# Patient Record
Sex: MALE | Race: WHITE | NOT HISPANIC OR LATINO | Employment: OTHER | URBAN - METROPOLITAN AREA
[De-identification: names, ages, dates, MRNs, and addresses within clinical notes are randomized per-mention and may not be internally consistent; named-entity substitution may affect disease eponyms.]

---

## 2024-05-07 ENCOUNTER — APPOINTMENT (OUTPATIENT)
Dept: RADIOLOGY | Facility: AMBULARY SURGERY CENTER | Age: 74
End: 2024-05-07
Attending: ORTHOPAEDIC SURGERY
Payer: MEDICARE

## 2024-05-07 VITALS — HEIGHT: 67 IN | BODY MASS INDEX: 27.15 KG/M2 | WEIGHT: 173 LBS

## 2024-05-07 DIAGNOSIS — M25.571 PAIN, JOINT, ANKLE AND FOOT, RIGHT: ICD-10-CM

## 2024-05-07 DIAGNOSIS — Z01.89 ENCOUNTER FOR LOWER EXTREMITY COMPARISON IMAGING STUDY: ICD-10-CM

## 2024-05-07 DIAGNOSIS — M19.171 POST-TRAUMATIC ARTHRITIS OF RIGHT ANKLE: Primary | ICD-10-CM

## 2024-05-07 PROCEDURE — 73600 X-RAY EXAM OF ANKLE: CPT

## 2024-05-07 PROCEDURE — 99204 OFFICE O/P NEW MOD 45 MIN: CPT | Performed by: ORTHOPAEDIC SURGERY

## 2024-05-07 PROCEDURE — 73610 X-RAY EXAM OF ANKLE: CPT

## 2024-05-07 RX ORDER — FERROUS SULFATE 324(65)MG
324 TABLET, DELAYED RELEASE (ENTERIC COATED) ORAL
COMMUNITY
Start: 2024-04-04

## 2024-05-07 RX ORDER — LISINOPRIL 5 MG/1
5 TABLET ORAL DAILY
COMMUNITY
Start: 2024-02-26 | End: 2025-02-25

## 2024-05-07 RX ORDER — ASCORBIC ACID 500 MG
500 TABLET ORAL DAILY
COMMUNITY
Start: 2024-04-04

## 2024-05-07 RX ORDER — PANTOPRAZOLE SODIUM 40 MG/1
40 TABLET, DELAYED RELEASE ORAL DAILY
COMMUNITY
Start: 2024-03-30

## 2024-05-07 RX ORDER — ATORVASTATIN CALCIUM 40 MG/1
40 TABLET, FILM COATED ORAL
COMMUNITY

## 2024-05-07 RX ORDER — SODIUM, POTASSIUM,MAG SULFATES 17.5-3.13G
SOLUTION, RECONSTITUTED, ORAL ORAL
COMMUNITY
Start: 2024-04-02

## 2024-05-07 NOTE — PROGRESS NOTES
James R Lachman, M.D.  Attending, Orthopaedic Surgery  Foot and Ankle  [facility]        ORTHOPAEDIC FOOT AND ANKLE CLINIC VISIT     Assessment:     Encounter Diagnoses   Name Primary?    Pain, joint, ankle and foot, right     Encounter for lower extremity comparison imaging study     Post-traumatic arthritis of right ankle Yes              Plan:   The patient verbalized understanding of exam findings and treatment plan. We engaged in the shared decision-making process and treatment options were discussed at length with the patient. Surgical and conservative management discussed today along with risks and benefits.  Patient has end-stage osteoarthritis of the right ankle that has failed to respond to conservative treatment measures with corticosteroid injection, physical therapy, and bracing.    He is a surgical candidate for a right total ankle arthroplasty with possible medial calcaneal slide osteotomy, possible cotton osteotomy  and Ross  Details regarding surgical treatment were reviewed and discussed extensively with the patient  He will follow-up 6 weeks prior to when he would like to schedule right total ankle arthroplasty  Rest, ice, elevation, over-the-counter anti-inflammatories as needed for pain control  Return if symptoms worsen or fail to improve.      History of Present Illness:   Chief Complaint:   Chief Complaint   Patient presents with    Right Ankle - Pain     Right ankle replacement surgery.      Nura Yañez is a 73 y.o. male who is being seen for right ankle pain. He has a history of right ankle osteoarthritis that has failed conservative treatment (PT, CSI, and bracing). He recently underwent a left total ankle arthropalsty by Dr. Zheng at [facility] on 12/7/23. He is doing well with regards to the left ankle and happy with the outcome.  Pain is localized at diffusely throughout the ankle with minimal radiating and described as sharp and severe. Patient denies  "numbness, tingling or radicular pain.  Denies history of neuropathy.  Patient does not smoke, does not have diabetes and does not take blood thinners.  Patient denies family history of anesthesia complications and has not had any complications with anesthesia.     Pain/symptom timing:  Worse during the day when active  Pain/symptom context:  Worse with activites and work  Pain/symptom modifying factors:  Rest makes better, activities make worse  Pain/symptom associated signs/symptoms: none    Prior treatment   NSAIDsYes    Injections Yes   Bracing/Orthotics Yes   Physical Therapy Yes     Orthopedic Surgical History:   See above     Past Medical, Surgical and Social History:  Past Medical History:  has no past medical history on file.  Problem List: does not have any pertinent problems on file.  Past Surgical History:  has no past surgical history on file.  Family History: family history is not on file.  Social History:  reports that he has never smoked. He has never used smokeless tobacco. He reports current alcohol use of about 2.0 standard drinks of alcohol per week. He reports that he does not use drugs.  Current Medications: has a current medication list which includes the following prescription(s): ascorbic acid, atorvastatin, ferrous sulfate, ferrous sulfate, lisinopril, na sulfate-k sulfate-mg sulf, and pantoprazole.  Allergies: has no allergies on file.     Review of Systems:  General- denies fever/chills  HEENT- denies hearing loss or sore throat  Eyes- denies eye pain or visual disturbances, denies red eyes  Respiratory- denies cough or SOB  Cardio- denies chest pain or palpitations  GI- denies abdominal pain  Endocrine- denies urinary frequency  Urinary- denies pain with urination  Musculoskeletal- Negative except noted above  Skin- denies rashes or wounds  Neurological- denies dizziness or headache  Psychiatric- denies anxiety or difficulty concentrating    Physical Exam:   Ht 5' 7\" (1.702 m)   Wt 78.5 " kg (173 lb)   BMI 27.10 kg/m²   General/Constitutional: No apparent distress: well-nourished and well developed.  Eyes: normal ocular motion  Cardio: RRR, Normal S1S2, No m/r/g  Lymphatic: No appreciable lymphadenopathy  Respiratory: Non-labored breathing, CTA b/l no w/c/r  Vascular: No edema, swelling or tenderness, except as noted in detailed exam.  Integumentary: No impressive skin lesions present, except as noted in detailed exam.  Neuro: No ataxia or tremors noted  Psych: Normal mood and affect, oriented to person, place and time. Appropriate affect.  Musculoskeletal: Normal, except as noted in detailed exam and in HPI.    Examination    Right    Gait Antalgic   Musculoskeletal Tender to palpation at diffusely ankle    Skin Normal.      Nails Normal    Range of Motion  10 degrees dorsiflexion, 20 degrees plantarflexion  Subtalar motion: noraml    Stability Stable    Muscle Strength 5/5 tibialis anterior  5/5 gastrocnemius-soleus  5/5 posterior tibialis  5/5 peroneal/eversion strength  5/5 EHL  5/5 FHL    Neurologic Normal    Sensation Intact to light touch throughout sural, saphenous, superficial peroneal, deep peroneal and medial/lateral plantar nerve distributions.  [place]her[place]-Jay 5.07 filament (10g) testing deferred.    Cardiovascular Brisk capillary refill < 2 seconds,intact DP and PT pulses    Special Tests None      Imaging Studies:   3 views of the right ankle were taken, reviewed and interpreted independently that demonstrate severe degenerative changes of the tibiotalar joint; stable left total ankle implants in appropriate alignment without signs of failure or loosening. Reviewed by me personally.        James R. Lachman, MD  Foot & Ankle Surgery   Department of Orthopaedic Surgery  [facility]      I personally performed the service.    James R. Lachman, MD

## 2024-06-14 ENCOUNTER — APPOINTMENT (EMERGENCY)
Dept: RADIOLOGY | Facility: HOSPITAL | Age: 74
End: 2024-06-14
Payer: MEDICARE

## 2024-06-14 ENCOUNTER — HOSPITAL ENCOUNTER (OUTPATIENT)
Facility: HOSPITAL | Age: 74
Setting detail: OBSERVATION
Discharge: HOME | End: 2024-06-15
Attending: STUDENT IN AN ORGANIZED HEALTH CARE EDUCATION/TRAINING PROGRAM | Admitting: HOSPITALIST
Payer: MEDICARE

## 2024-06-14 DIAGNOSIS — R07.9 CHEST PAIN: Primary | ICD-10-CM

## 2024-06-14 DIAGNOSIS — R03.0 ELEVATED BLOOD PRESSURE READING: ICD-10-CM

## 2024-06-14 PROBLEM — E61.1 IRON DEFICIENCY: Status: ACTIVE | Noted: 2024-06-14

## 2024-06-14 PROBLEM — I10 HYPERTENSION: Status: ACTIVE | Noted: 2024-06-14

## 2024-06-14 LAB
2HR DELTA HS TROPONIN: 0 NG/L
4HR DELTA HS TROPONIN: 0 NG/L
ALBUMIN SERPL BCP-MCNC: 4.3 G/DL (ref 3.5–5)
ALP SERPL-CCNC: 79 U/L (ref 34–104)
ALT SERPL W P-5'-P-CCNC: 16 U/L (ref 7–52)
ANION GAP SERPL CALCULATED.3IONS-SCNC: 9 MMOL/L (ref 4–13)
AST SERPL W P-5'-P-CCNC: 19 U/L (ref 13–39)
BASOPHILS # BLD AUTO: 0.03 THOUSANDS/ÂΜL (ref 0–0.1)
BASOPHILS NFR BLD AUTO: 0 % (ref 0–1)
BILIRUB SERPL-MCNC: 0.34 MG/DL (ref 0.2–1)
BUN SERPL-MCNC: 16 MG/DL (ref 5–25)
CALCIUM SERPL-MCNC: 9 MG/DL (ref 8.4–10.2)
CARDIAC TROPONIN I PNL SERPL HS: 7 NG/L
CHLORIDE SERPL-SCNC: 105 MMOL/L (ref 96–108)
CO2 SERPL-SCNC: 23 MMOL/L (ref 21–32)
CREAT SERPL-MCNC: 1 MG/DL (ref 0.6–1.3)
D DIMER PPP FEU-MCNC: 0.43 UG/ML FEU
EOSINOPHIL # BLD AUTO: 0.06 THOUSAND/ÂΜL (ref 0–0.61)
EOSINOPHIL NFR BLD AUTO: 1 % (ref 0–6)
ERYTHROCYTE [DISTWIDTH] IN BLOOD BY AUTOMATED COUNT: 19.4 % (ref 11.6–15.1)
GFR SERPL CREATININE-BSD FRML MDRD: 75 ML/MIN/1.73SQ M
GLUCOSE SERPL-MCNC: 153 MG/DL (ref 65–140)
HCT VFR BLD AUTO: 34 % (ref 36.5–49.3)
HGB BLD-MCNC: 9.6 G/DL (ref 12–17)
IMM GRANULOCYTES # BLD AUTO: 0.15 THOUSAND/UL (ref 0–0.2)
IMM GRANULOCYTES NFR BLD AUTO: 2 % (ref 0–2)
LYMPHOCYTES # BLD AUTO: 2.04 THOUSANDS/ÂΜL (ref 0.6–4.47)
LYMPHOCYTES NFR BLD AUTO: 22 % (ref 14–44)
MCH RBC QN AUTO: 19.5 PG (ref 26.8–34.3)
MCHC RBC AUTO-ENTMCNC: 28.2 G/DL (ref 31.4–37.4)
MCV RBC AUTO: 69 FL (ref 82–98)
MONOCYTES # BLD AUTO: 0.69 THOUSAND/ÂΜL (ref 0.17–1.22)
MONOCYTES NFR BLD AUTO: 8 % (ref 4–12)
NEUTROPHILS # BLD AUTO: 6.2 THOUSANDS/ÂΜL (ref 1.85–7.62)
NEUTS SEG NFR BLD AUTO: 67 % (ref 43–75)
NRBC BLD AUTO-RTO: 0 /100 WBCS
PLATELET # BLD AUTO: 368 THOUSANDS/UL (ref 149–390)
PMV BLD AUTO: 10.1 FL (ref 8.9–12.7)
POTASSIUM SERPL-SCNC: 3.5 MMOL/L (ref 3.5–5.3)
PROT SERPL-MCNC: 6.7 G/DL (ref 6.4–8.4)
RBC # BLD AUTO: 4.92 MILLION/UL (ref 3.88–5.62)
SODIUM SERPL-SCNC: 137 MMOL/L (ref 135–147)
WBC # BLD AUTO: 9.17 THOUSAND/UL (ref 4.31–10.16)

## 2024-06-14 PROCEDURE — 99223 1ST HOSP IP/OBS HIGH 75: CPT | Performed by: HOSPITALIST

## 2024-06-14 PROCEDURE — 36415 COLL VENOUS BLD VENIPUNCTURE: CPT

## 2024-06-14 PROCEDURE — 80053 COMPREHEN METABOLIC PANEL: CPT | Performed by: STUDENT IN AN ORGANIZED HEALTH CARE EDUCATION/TRAINING PROGRAM

## 2024-06-14 PROCEDURE — 85025 COMPLETE CBC W/AUTO DIFF WBC: CPT | Performed by: STUDENT IN AN ORGANIZED HEALTH CARE EDUCATION/TRAINING PROGRAM

## 2024-06-14 PROCEDURE — 99285 EMERGENCY DEPT VISIT HI MDM: CPT | Performed by: STUDENT IN AN ORGANIZED HEALTH CARE EDUCATION/TRAINING PROGRAM

## 2024-06-14 PROCEDURE — 84484 ASSAY OF TROPONIN QUANT: CPT | Performed by: STUDENT IN AN ORGANIZED HEALTH CARE EDUCATION/TRAINING PROGRAM

## 2024-06-14 PROCEDURE — 99285 EMERGENCY DEPT VISIT HI MDM: CPT

## 2024-06-14 PROCEDURE — 84484 ASSAY OF TROPONIN QUANT: CPT | Performed by: HOSPITALIST

## 2024-06-14 PROCEDURE — 71045 X-RAY EXAM CHEST 1 VIEW: CPT

## 2024-06-14 PROCEDURE — 85379 FIBRIN DEGRADATION QUANT: CPT | Performed by: STUDENT IN AN ORGANIZED HEALTH CARE EDUCATION/TRAINING PROGRAM

## 2024-06-14 PROCEDURE — 93005 ELECTROCARDIOGRAM TRACING: CPT

## 2024-06-14 RX ORDER — ATORVASTATIN CALCIUM 40 MG/1
40 TABLET, FILM COATED ORAL
Status: DISCONTINUED | OUTPATIENT
Start: 2024-06-14 | End: 2024-06-15 | Stop reason: HOSPADM

## 2024-06-14 RX ORDER — ENOXAPARIN SODIUM 100 MG/ML
40 INJECTION SUBCUTANEOUS DAILY
Status: DISCONTINUED | OUTPATIENT
Start: 2024-06-15 | End: 2024-06-15 | Stop reason: HOSPADM

## 2024-06-14 RX ORDER — LISINOPRIL 5 MG/1
5 TABLET ORAL DAILY
Status: DISCONTINUED | OUTPATIENT
Start: 2024-06-15 | End: 2024-06-15 | Stop reason: HOSPADM

## 2024-06-14 RX ORDER — PANTOPRAZOLE SODIUM 40 MG/1
40 TABLET, DELAYED RELEASE ORAL
Status: DISCONTINUED | OUTPATIENT
Start: 2024-06-15 | End: 2024-06-15 | Stop reason: HOSPADM

## 2024-06-14 RX ADMIN — ATORVASTATIN CALCIUM 40 MG: 40 TABLET, FILM COATED ORAL at 21:02

## 2024-06-14 NOTE — H&P
[facility]  H&P  Name: Nura Yañez 73 y.o. male I MRN: [id number]  Unit/Bed#: ED-29 I Date of Admission: 6/14/2024   Date of Service: 6/14/2024 I Hospital Day: 0      Assessment & Plan   * Chest pain  Assessment & Plan  POA; described as pressure, occurred with minimal exertion; no h/o similar  HEART score 7; ELLA 2  Trop: wnl; EKG nonischemic  Trend trops, serial EKGs  Tele overnight  Anticipate dc in 24 hours with plan for OP stress     Hypertension  Assessment & Plan  Resume lisinopril     Iron deficiency  Assessment & Plan  Cont outpt iron infusions          VTE Pharmacologic Prophylaxis: VTE Score: 3 Moderate Risk (Score 3-4) - Pharmacological DVT Prophylaxis Ordered: enoxaparin (Lovenox).  Code Status: FULL   Discussion with family: Updated  (wife) at bedside.    Anticipated Length of Stay: Patient will be admitted on an observation basis with an anticipated length of stay of less than 2 midnights secondary to chest pain .    Total Time Spent on Date of Encounter in care of patient: 60 mins. This time was spent on one or more of the following: performing physical exam; counseling and coordination of care; obtaining or reviewing history; documenting in the medical record; reviewing/ordering tests, medications or procedures; communicating with other healthcare professionals and discussing with patient's family/caregivers.    Chief Complaint: chest pressure     History of Present Illness:  Nura Yañez is a 73 y.o. male with a PMH of hypertension, hiatal hernia, GALILEA who presents with an episode of chest pressure.  Patient was grocery shopping with his wife when he felt some discomfort in his lower chest.  No associated shortness of breath, diaphoresis.  Did not radiate.  Did not feel he was exerting himself at the time.  Patient is fairly active, swims 3 times weekly.  Denies similar symptoms with exertion.  Received his first iron infusion yesterday and questions  whether this could be related..    Review of Systems:  Review of Systems   Constitutional:  Negative for chills and fever.   Respiratory:  Negative for choking, chest tightness and shortness of breath.    Cardiovascular:  Positive for chest pain. Negative for palpitations and leg swelling.       Past Medical and Surgical History:   History reviewed. No pertinent past medical history.    History reviewed. No pertinent surgical history.    Meds/Allergies:  Prior to Admission medications    Medication Sig Start Date End Date Taking? Authorizing Provider   ascorbic acid (VITAMIN C) 500 mg tablet Take 500 mg by mouth daily 4/4/24   Historical Provider, MD   atorvastatin (LIPITOR) 40 mg tablet Take 40 mg by mouth daily at bedtime    Historical Provider, MD   Ferrous Sulfate (IRON PO) Take by mouth    Historical Provider, MD   ferrous sulfate 324 (65 Fe) mg Take 324 mg by mouth daily with breakfast 4/4/24   Historical Provider, MD   lisinopril (ZESTRIL) 5 mg tablet Take 5 mg by mouth daily 2/26/24 2/25/25  Historical Provider, MD   Na Sulfate-K Sulfate-Mg Sulf 17.5-3.13-1.6 GM/177ML SOLN FOLLOW DIRECTIONS PROVIDED BY PRESCRIBER 4/2/24   Historical Provider, MD   pantoprazole (PROTONIX) 40 mg tablet Take 40 mg by mouth daily 3/30/24   Historical Provider, MD     I have reviewed home medications using recent Epic encounter.    Allergies: No Known Allergies    Social History:  Marital Status: /Civil Union   Occupation:   Patient Pre-hospital Living Situation: Home  Patient Pre-hospital Level of Mobility: walks  Patient Pre-hospital Diet Restrictions:   Substance Use History:   Social History     Substance and Sexual Activity   Alcohol Use Yes    Alcohol/week: 2.0 standard drinks of alcohol    Types: 2 Cans of beer per week     Social History     Tobacco Use   Smoking Status Never   Smokeless Tobacco Never     Social History     Substance and Sexual Activity   Drug Use Never       Family History:  History reviewed. No  pertinent family history.    Physical Exam:     Vitals:   Blood Pressure: 143/69 (06/14/24 1425)  Pulse: 88 (06/14/24 1425)  Temperature: 98.2 °F (36.8 °C) (06/14/24 1329)  Respirations: 16 (06/14/24 1425)  SpO2: 98 % (06/14/24 1425)    Physical Exam  Vitals and nursing note reviewed.   Constitutional:       General: He is not in acute distress.     Appearance: Normal appearance. He is not ill-appearing or toxic-appearing.   Cardiovascular:      Rate and Rhythm: Normal rate and regular rhythm.      Heart sounds: No murmur heard.     No friction rub. No gallop.   Pulmonary:      Effort: Pulmonary effort is normal. No respiratory distress.      Breath sounds: Normal breath sounds. No wheezing, rhonchi or rales.   Abdominal:      General: Abdomen is flat.      Palpations: Abdomen is soft.      Tenderness: There is no abdominal tenderness. There is no guarding or rebound.   Musculoskeletal:      Right lower leg: No edema.      Left lower leg: No edema.   Neurological:      General: No focal deficit present.      Mental Status: He is alert and oriented to person, place, and time.          Additional Data:     Lab Results:  Results from last 7 days   Lab Units 06/14/24  1329   WBC Thousand/uL 9.17   HEMOGLOBIN g/dL 9.6*   HEMATOCRIT % 34.0*   PLATELETS Thousands/uL 368   SEGS PCT % 67   LYMPHO PCT % 22   MONO PCT % 8   EOS PCT % 1     Results from last 7 days   Lab Units 06/14/24  1329   SODIUM mmol/L 137   POTASSIUM mmol/L 3.5   CHLORIDE mmol/L 105   CO2 mmol/L 23   BUN mg/dL 16   CREATININE mg/dL 1.00   ANION GAP mmol/L 9   CALCIUM mg/dL 9.0   ALBUMIN g/dL 4.3   TOTAL BILIRUBIN mg/dL 0.34   ALK PHOS U/L 79   ALT U/L 16   AST U/L 19   GLUCOSE RANDOM mg/dL 153*             Lab Results   Component Value Date    HGBA1C 4.5 03/30/2024    HGBA1C  03/30/2024     Unable to determine A1c due to interfering substance, reflexed to alternate methodology.    HGBA1C 5.1 02/19/2014           Lines/Drains:  Invasive Devices        Peripheral Intravenous Line  Duration             Peripheral IV 06/14/24 Left Antecubital <1 day                        Imaging: Reviewed radiology reports from this admission including: chest xray  XR chest 1 view portable   Final Result by Chris Martínez MD (06/14 1621)   Hiatal hernia.   No acute cardiopulmonary disease.            Workstation performed: [id number]             EKG and Other Studies Reviewed on Admission:   EKG: Sinus Tachycardia. .    ** Please Note: This note has been constructed using a voice recognition system. **

## 2024-06-14 NOTE — ASSESSMENT & PLAN NOTE
POA; described as pressure, occurred with minimal exertion; no h/o similar  HEART score 7; ELLA 2  Trop: wnl; EKG nonischemic  Trend trops, serial EKGs  Tele overnight  Anticipate dc in 24 hours with plan for OP stress

## 2024-06-14 NOTE — ED ATTENDING ATTESTATION
6/14/2024  I, Bernabe Grullon DO, saw and evaluated the patient. I have discussed the patient with the resident/non-physician practitioner and agree with the resident's/non-physician practitioner's findings, Plan of Care, and MDM as documented in the resident's/non-physician practitioner's note, except where noted. All available labs and Radiology studies were reviewed.  I was present for key portions of any procedure(s) performed by the resident/non-physician practitioner and I was immediately available to provide assistance.       At this point I agree with the current assessment done in the Emergency Department.  I have conducted an independent evaluation of this patient a history and physical is as follows:    73 year old male presents to ED for evaluation of chest pain.  Mild in severity.  Occurred while walking around at grocery store.  Located substernal without radiation.  Associated with low back pain.  Has relatively new diagnosis of anemia and had first iron infusion yesterday.  Unknown if related.  Has not seen a cardiologist for at least 10 years, has not had a stress test for at least 20 years.  On exam, resting comfortably in no acute distress, speaking in full sentences with no respiratory difficulty, pulse regular with normal rate, moving all extremities with no gross motor deficit.  EKG is nonischemic as interpreted by myself.  Labs show no leukocytosis, stable anemia compared to prior labs, normal platelet count, no acute electrode abnormalities, normal renal function, hyperglycemia 153, no acute LFT abnormalities initial troponin of 7.  Heart score of 7: 2 for history, 1 for EKG, 2 for age, 2 for risk factors, 0 for troponin.  Chest x-ray shows no acute cardiopulmonary pathology as interpreted by myself pending final radiology read.  Patient is low risk Wells PE however due to age cannot apply PERC, D-dimer sent and found to be negative.  Due to high heart score, plan will be for hospitalization.   Resident discussed with admitting team who accept the patient onto their service for further care and management.  Stable to time of disposition    ED Course  ED Course as of 06/14/24 1551   Fri Jun 14, 2024   1549 Lab called for add on of d-dimer ordered at 1539         Critical Care Time  Procedures

## 2024-06-14 NOTE — ED PROVIDER NOTES
History  Chief Complaint   Patient presents with    Chest Pain     Chest and back tightness, resolved     HPI    Nura Yañez is a 73 y.o. male with history of HTN, HLD, hernia, anema presenting for chest pain.    Patient presents with wife after sudden onset chest pain starting just before presentation. Pain described as squeezing in the center of his chest, as well as some squeezing/spasm across his lower back. No other associated symptoms, no shortness of breath, lightheadedness, dizziness, diaphoresis, nausea or vomiting. Patient had never had this pain before. No history of cardiac issues. No history of clots, no leg pain or swelling, no immobility, no recent surgeries, no hemoptysis. History of GERD. No history of previous MI. Symptoms resolved by time of evaluation.    Patient did have a recent iron infusion yesterday due to his chronic anemia, patient reports he was told that chest tightness and back pain were possible side effects.    Prior to Admission Medications   Prescriptions Last Dose Informant Patient Reported? Taking?   Ferrous Sulfate (IRON PO)   Yes No   Sig: Take by mouth   Na Sulfate-K Sulfate-Mg Sulf 17.5-3.13-1.6 GM/177ML SOLN   Yes No   Sig: FOLLOW DIRECTIONS PROVIDED BY PRESCRIBER   ascorbic acid (VITAMIN C) 500 mg tablet   Yes No   Sig: Take 500 mg by mouth daily   atorvastatin (LIPITOR) 40 mg tablet   Yes No   Sig: Take 40 mg by mouth daily at bedtime   ferrous sulfate 324 (65 Fe) mg   Yes No   Sig: Take 324 mg by mouth daily with breakfast   lisinopril (ZESTRIL) 5 mg tablet   Yes No   Sig: Take 5 mg by mouth daily   pantoprazole (PROTONIX) 40 mg tablet   Yes No   Sig: Take 40 mg by mouth daily      Facility-Administered Medications: None       History reviewed. No pertinent past medical history.    History reviewed. No pertinent surgical history.    History reviewed. No pertinent family history.  I have reviewed and agree with the history as documented.    E-Cigarette/Vaping      E-Cigarette/Vaping Substances     Social History     Tobacco Use    Smoking status: Never    Smokeless tobacco: Never   Substance Use Topics    Alcohol use: Yes     Alcohol/week: 2.0 standard drinks of alcohol     Types: 2 Cans of beer per week    Drug use: Never        Review of Systems   Constitutional:  Negative for chills and fever.   HENT:  Negative for congestion, rhinorrhea and sore throat.    Eyes:  Negative for pain and visual disturbance.   Respiratory:  Negative for cough, chest tightness, shortness of breath, wheezing and stridor.    Cardiovascular:  Positive for chest pain. Negative for palpitations and leg swelling.   Gastrointestinal:  Negative for abdominal pain, constipation, diarrhea, nausea and vomiting.   Genitourinary:  Negative for dysuria and hematuria.   Musculoskeletal:  Positive for back pain (lower back). Negative for arthralgias.   Skin:  Negative for color change, pallor and rash.   Neurological:  Negative for dizziness, syncope, light-headedness, numbness and headaches.   Psychiatric/Behavioral:  Negative for behavioral problems.    All other systems reviewed and are negative.      Physical Exam  ED Triage Vitals   Temperature Pulse Respirations Blood Pressure SpO2   06/14/24 1329 06/14/24 1329 06/14/24 1329 06/14/24 1329 06/14/24 1329   98.2 °F (36.8 °C) (!) 115 18 (!) 174/84 98 %      Temp src Heart Rate Source Patient Position - Orthostatic VS BP Location FiO2 (%)   -- 06/14/24 1425 06/14/24 1425 06/14/24 1425 --    Monitor Sitting Right arm       Pain Score       06/14/24 1858       No Pain             Orthostatic Vital Signs  Vitals:    06/14/24 1329 06/14/24 1425 06/14/24 1837 06/14/24 1914   BP: (!) 174/84 143/69 152/74 159/84   Pulse: (!) 115 88 68 66   Patient Position - Orthostatic VS:  Sitting         Physical Exam  Vitals and nursing note reviewed.   Constitutional:       General: He is not in acute distress.     Appearance: Normal appearance. He is well-developed.    HENT:      Head: Normocephalic and atraumatic.      Nose: No rhinorrhea.      Mouth/Throat:      Mouth: Mucous membranes are moist.      Pharynx: Oropharynx is clear.   Eyes:      Extraocular Movements: Extraocular movements intact.      Conjunctiva/sclera: Conjunctivae normal.      Pupils: Pupils are equal, round, and reactive to light.   Cardiovascular:      Rate and Rhythm: Normal rate and regular rhythm.      Pulses: Normal pulses.      Heart sounds: Normal heart sounds. No murmur heard.  Pulmonary:      Effort: Pulmonary effort is normal. No respiratory distress.      Breath sounds: Normal breath sounds. No wheezing, rhonchi or rales.   Abdominal:      General: Abdomen is flat. Bowel sounds are normal. There is no distension.      Palpations: Abdomen is soft.      Tenderness: There is no abdominal tenderness. There is no guarding or rebound.   Musculoskeletal:      Cervical back: Neck supple.      Right lower leg: No edema.      Left lower leg: No edema.   Skin:     General: Skin is warm and dry.      Capillary Refill: Capillary refill takes less than 2 seconds.   Neurological:      General: No focal deficit present.      Mental Status: He is alert and oriented to person, place, and time.   Psychiatric:         Mood and Affect: Mood normal.         Behavior: Behavior normal.         ED Medications  Medications   atorvastatin (LIPITOR) tablet 40 mg (has no administration in time range)   lisinopril (ZESTRIL) tablet 5 mg (has no administration in time range)   pantoprazole (PROTONIX) EC tablet 40 mg (has no administration in time range)   enoxaparin (LOVENOX) subcutaneous injection 40 mg (has no administration in time range)       Diagnostic Studies  Results Reviewed       Procedure Component Value Units Date/Time    HS Troponin I 4hr [[id number]]  (Normal) Collected: 06/14/24 1834    Lab Status: Final result Specimen: Blood from Arm, Left Updated: 06/14/24 1908     hs TnI 4hr 7 ng/L      Delta 4hr hsTnI 0 ng/L      HS Troponin I 2hr [[id number]]  (Normal) Collected: 06/14/24 1614    Lab Status: Final result Specimen: Blood from Arm, Left Updated: 06/14/24 1654     hs TnI 2hr 7 ng/L      Delta 2hr hsTnI 0 ng/L     D-dimer, quantitative [[id number]]  (Normal) Collected: 06/14/24 1329    Lab Status: Final result Specimen: Blood from Arm, Left Updated: 06/14/24 1604     D-Dimer, Quant 0.43 ug/ml FEU     Narrative:      In the evaluation for possible pulmonary embolism, in the appropriate (Well's Score of 4 or less) patient, the age adjusted d-dimer cutoff for this patient can be calculated as:    Age x 0.01 (in ug/mL) for Age-adjusted D-dimer exclusion threshold for a patient over 50 years.    HS Troponin 0hr (reflex protocol) [[id number]]  (Normal) Collected: 06/14/24 1329    Lab Status: Final result Specimen: Blood from Arm, Left Updated: 06/14/24 1406     hs TnI 0hr 7 ng/L     Comprehensive metabolic panel [[id number]]  (Abnormal) Collected: 06/14/24 1329    Lab Status: Final result Specimen: Blood from Arm, Left Updated: 06/14/24 1404     Sodium 137 mmol/L      Potassium 3.5 mmol/L      Chloride 105 mmol/L      CO2 23 mmol/L      ANION GAP 9 mmol/L      BUN 16 mg/dL      Creatinine 1.00 mg/dL      Glucose 153 mg/dL      Calcium 9.0 mg/dL      AST 19 U/L      ALT 16 U/L      Alkaline Phosphatase 79 U/L      Total Protein 6.7 g/dL      Albumin 4.3 g/dL      Total Bilirubin 0.34 mg/dL      eGFR 75 ml/min/1.73sq m     Narrative:      [employer] Kid[employer] guidelines for Chronic Kidney Disease (CKD):     Stage 1 with normal or high GFR (GFR > 90 mL/min/1.73 square meters)    Stage 2 Mild CKD (GFR = 60-89 mL/min/1.73 square meters)    Stage 3A Moderate CKD (GFR = 45-59 mL/min/1.73 square meters)    Stage 3B Moderate CKD (GFR = 30-44 mL/min/1.73 square meters)    Stage 4 Severe CKD (GFR = 15-29 mL/min/1.73 square meters)    Stage 5 End Stage CKD (GFR <15 mL/min/1.73 square meters)  Note: GFR calculation is accurate only  with a steady state creatinine    CBC and differential [[id number]]  (Abnormal) Collected: 06/14/24 1329    Lab Status: Final result Specimen: Blood from Arm, Left Updated: 06/14/24 1343     WBC 9.17 Thousand/uL      RBC 4.92 Million/uL      Hemoglobin 9.6 g/dL      Hematocrit 34.0 %      MCV 69 fL      MCH 19.5 pg      MCHC 28.2 g/dL      RDW 19.4 %      MPV 10.1 fL      Platelets 368 Thousands/uL      nRBC 0 /100 WBCs      Segmented % 67 %      Immature Grans % 2 %      Lymphocytes % 22 %      Monocytes % 8 %      Eosinophils Relative 1 %      Basophils Relative 0 %      Absolute Neutrophils 6.20 Thousands/µL      Absolute Immature Grans 0.15 Thousand/uL      Absolute Lymphocytes 2.04 Thousands/µL      Absolute Monocytes 0.69 Thousand/µL      Eosinophils Absolute 0.06 Thousand/µL      Basophils Absolute 0.03 Thousands/µL                    XR chest 1 view portable   Final Result by Chris Martínez MD (06/14 1621)   Hiatal hernia.   No acute cardiopulmonary disease.            Workstation performed: [id number]               Procedures  ECG 12 Lead Documentation Only    Date/Time: 6/14/2024 2:39 PM    Performed by: Yadira Mackey MD  Authorized by: Yadira Mackey MD    Indications / Diagnosis:  Chest pain  ECG reviewed by me, the ED Provider: yes    Patient location:  ED  Previous ECG:     Previous ECG:  Compared to current    Comparison ECG info:  2/18/2014    Similarity:  Changes noted    Comparison to cardiac monitor: Yes    Interpretation:     Interpretation: non-specific    Quality:     Tracing quality:  Limited by artifact  Rate:     ECG rate:  121    ECG rate assessment: tachycardic    Rhythm:     Rhythm: sinus tachycardia    Ectopy:     Ectopy: none    QRS:     QRS axis:  Normal    QRS intervals:  Normal  Conduction:     Conduction: abnormal      Abnormal conduction: incomplete RBBB    ST segments:     ST segments:  Normal  T waves:     T waves: non-specific    Comments:      QTc 457        ED  Course  ED Course as of 06/14/24 2019 Fri Jun 14, 2024   1424 Patient seen and evaluated at bedside.   1442 hs TnI 0hr: 7  Will need 2 hour   1442 Hemoglobin(!): 9.6  Chronic anemia, improved from previous   1442 CBC and differential(!)  No leukocytosis, chronic anemia improved from last value in the 7's, platelets normal   1442 Comprehensive metabolic panel(!)  Grossly normal   1607 D-Dimer, Quant: 0.43  Unlikely to be PE/VTE at this time             HEART Risk Score      Flowsheet Row Most Recent Value   Heart Score Risk Calculator    History 2 Filed at: 06/14/2024 1415   ECG 1 Filed at: 06/14/2024 1415   Age 2 Filed at: 06/14/2024 1415   Risk Factors 2 Filed at: 06/14/2024 1415   Troponin 0 Filed at: 06/14/2024 1415   HEART Score 7 Filed at: 06/14/2024 1415                          ELLA Risk Score      Flowsheet Row Most Recent Value   Age >= 65 1 Filed at: 06/14/2024 1811   Known CAD (stenosis >= 50%) 0 Filed at: 06/14/2024 1811   Recent (<=24 hrs) Service Angina 0 Filed at: 06/14/2024 1811   ST Deviation >= 0.5 mm 0 Filed at: 06/14/2024 1811   3+ CAD Risk Factors (FHx, HTN, HLP, DM, Smoker) 1 Filed at: 06/14/2024 1811   Aspirin Use Past 7 Days 0 Filed at: 06/14/2024 1811   Elevated Cardiac Markers 0 Filed at: 06/14/2024 1811   ELLA Risk Score (Calculated) 2 Filed at: 06/14/2024 1811          Wells' Criteria for PE      Flowsheet Row Most Recent Value   Wells' Criteria for PE    Clinical signs and symptoms of DVT 0 Filed at: 06/14/2024 1539   PE is primary diagnosis or equally likely 0 Filed at: 06/14/2024 1539   HR >100 1.5 Filed at: 06/14/2024 1539   Immobilization at least 3 days or Surgery in the previous 4 weeks 0 Filed at: 06/14/2024 1539   Previous, objectively diagnosed PE or DVT 0 Filed at: 06/14/2024 1539   Hemoptysis 0 Filed at: 06/14/2024 1539   Malignancy with treatment within 6 months or palliative 0 Filed at: 06/14/2024 1539   Wells' Criteria Total 1.5 Filed at: 06/14/2024 1539               Medical Decision Making  Nura Yañez is a 73 y.o. male presenting for non-exertional, non-radiating chest pain just prior to presentation to the ED. No nausea, vomiting,   On presentation to the ED, patient was afebrile, vital signs showing hypertension on presentation which improved during time in the ED, otherwise unremarkable. Exam unremarkable, heart regular rhythm, lungs clear bilaterally, no chest tenderness, no abdominal pain.    DDX including but not limited to: ACS, MI, PE, PTX, pneumonia, dissection, pleurisy, pericarditis, myocarditis, rhabdomyolysis, GI etiology.    Based on results available while the patient was in the ED:  Lab work overall unremarkable. Unlikely to have PE but could not PERC out because of age, thankfully D-dimer negative. Patient remained asymptomatic, blood pressure improved. Initial troponin 7, heart score also elevated at 7. Unclear exact etiology of chest pain, possibly related to muslculoskelatal pain vs GI vs unexpected effect of infusion. However, though workup grossly negative, due to elevated heart score secondary to age, story, and risk, patient would most benefit from admission for further monitoring.    See ED Course for further updates    After evaluation and workup in the emergency department Discussed patient's case with [facility] regarding admission who accepted the patient for further evaluation and management under Dr. Brownlee (JOSEPH).    Amount and/or Complexity of Data Reviewed  Independent Historian: karen  External Data Reviewed: labs, radiology, ECG and notes.  Labs: ordered. Decision-making details documented in ED Course.  Radiology: ordered.  ECG/medicine tests: ordered and independent interpretation performed.    Risk  Decision regarding hospitalization.          Disposition  Final diagnoses:   Chest pain   Elevated blood pressure reading     Time reflects when diagnosis was documented in both MDM as applicable and the Disposition within this note        Time User Action Codes Description Comment    6/14/2024  2:34 PM Yadira Mackey Add [R07.9] Chest pain     6/14/2024  4:17 PM Yadira Mackey Add [R03.0] Elevated blood pressure reading           ED Disposition       ED Disposition   Admit    Condition   Stable    Date/Time   Fri Jun 14, 2024 4781    Comment   Case was discussed with JOSEPH and the patient's admission status was agreed to be Admission Status: observation status to the service of Dr. Brownlee .               Follow-up Information    None         Current Discharge Medication List        CONTINUE these medications which have NOT CHANGED    Details   ascorbic acid (VITAMIN C) 500 mg tablet Take 500 mg by mouth daily      atorvastatin (LIPITOR) 40 mg tablet Take 40 mg by mouth daily at bedtime      Ferrous Sulfate (IRON PO) Take by mouth      ferrous sulfate 324 (65 Fe) mg Take 324 mg by mouth daily with breakfast      lisinopril (ZESTRIL) 5 mg tablet Take 5 mg by mouth daily      Na Sulfate-K Sulfate-Mg Sulf 17.5-3.13-1.6 GM/177ML SOLN FOLLOW DIRECTIONS PROVIDED BY PRESCRIBER      pantoprazole (PROTONIX) 40 mg tablet Take 40 mg by mouth daily           No discharge procedures on file.    PDMP Review       None             ED Provider  Attending physically available and evaluated Nura Yañez. I managed the patient along with the ED Attending.    Electronically Signed by           Yadira Mackey MD  06/14/24 2019

## 2024-06-15 VITALS
RESPIRATION RATE: 20 BRPM | HEIGHT: 67 IN | SYSTOLIC BLOOD PRESSURE: 140 MMHG | WEIGHT: 175.49 LBS | OXYGEN SATURATION: 99 % | BODY MASS INDEX: 27.54 KG/M2 | DIASTOLIC BLOOD PRESSURE: 82 MMHG | TEMPERATURE: 97.6 F | HEART RATE: 68 BPM

## 2024-06-15 LAB
ATRIAL RATE: 121 BPM
P AXIS: 16 DEGREES
PLATELET # BLD AUTO: 338 THOUSANDS/UL (ref 149–390)
PMV BLD AUTO: 10.4 FL (ref 8.9–12.7)
PR INTERVAL: 156 MS
QRS AXIS: 57 DEGREES
QRSD INTERVAL: 100 MS
QT INTERVAL: 322 MS
QTC INTERVAL: 457 MS
T WAVE AXIS: 24 DEGREES
VENTRICULAR RATE: 121 BPM

## 2024-06-15 PROCEDURE — 93010 ELECTROCARDIOGRAM REPORT: CPT | Performed by: INTERNAL MEDICINE

## 2024-06-15 PROCEDURE — 85049 AUTOMATED PLATELET COUNT: CPT | Performed by: HOSPITALIST

## 2024-06-15 PROCEDURE — 99239 HOSP IP/OBS DSCHRG MGMT >30: CPT | Performed by: PHYSICIAN ASSISTANT

## 2024-06-15 RX ADMIN — PANTOPRAZOLE SODIUM 40 MG: 40 TABLET, DELAYED RELEASE ORAL at 07:09

## 2024-06-15 RX ADMIN — LISINOPRIL 5 MG: 5 TABLET ORAL at 08:03

## 2024-06-15 NOTE — PLAN OF CARE
Problem: PAIN - ADULT  Goal: Verbalizes/displays adequate comfort level or baseline comfort level  Description: Interventions:  - Encourage patient to monitor pain and request assistance  - Assess pain using appropriate pain scale  - Administer analgesics based on type and severity of pain and evaluate response  - Implement non-pharmacological measures as appropriate and evaluate response  - Consider cultural and social influences on pain and pain management  - Notify physician/advanced practitioner if interventions unsuccessful or patient reports new pain  Outcome: Adequate for Discharge     Problem: SAFETY ADULT  Goal: Patient will remain free of falls  Description: INTERVENTIONS:  - Educate patient/family on patient safety including physical limitations  - Instruct patient to call for assistance with activity   - Consult OT/PT to assist with strengthening/mobility   - Keep Call bell within reach  - Keep bed low and locked with side rails adjusted as appropriate  - Keep care items and personal belongings within reach  - Initiate and maintain comfort rounds  - Make Fall Risk Sign visible to staff    - Apply yellow socks and bracelet for high fall risk patients  - Consider moving patient to room near nurses station  Outcome: Adequate for Discharge  Goal: Maintain or return to baseline ADL function  Description: INTERVENTIONS:  -  Assess patient's ability to carry out ADLs; assess patient's baseline for ADL function and identify physical deficits which impact ability to perform ADLs (bathing, care of mouth/teeth, toileting, grooming, dressing, etc.)  - Assess/evaluate cause of self-care deficits   - Assess range of motion  - Assess patient's mobility; develop plan if impaired  - Assess patient's need for assistive devices and provide as appropriate  - Encourage maximum independence but intervene and supervise when necessary  - Involve family in performance of ADLs  - Assess for home care needs following discharge    - Consider OT consult to assist with ADL evaluation and planning for discharge  - Provide patient education as appropriate  Outcome: Adequate for Discharge  Goal: Maintains/Returns to pre admission functional level  Description: INTERVENTIONS:  - Perform AM-PAC 6 Click Basic Mobility/ Daily Activity assessment daily.  - Set and communicate daily mobility goal to care team and patient/family/caregiver.   - Collaborate with rehabilitation services on mobility goals if consulted  - Out of bed for meals 3 times a day  - Out of bed for toileting  - Record patient progress and toleration of activity level   Outcome: Adequate for Discharge     Problem: DISCHARGE PLANNING  Goal: Discharge to home or other facility with appropriate resources  Description: INTERVENTIONS:  - Identify barriers to discharge w/patient and caregiver  - Arrange for needed discharge resources and transportation as appropriate  - Identify discharge learning needs (meds, wound care, etc.)  - Arrange for interpretive services to assist at discharge as needed  - Refer to Case Management Department for coordinating discharge planning if the patient needs post-hospital services based on physician/advanced practitioner order or complex needs related to functional status, cognitive ability, or social support system  Outcome: Adequate for Discharge     Problem: Knowledge Deficit  Goal: Patient/family/caregiver demonstrates understanding of disease process, treatment plan, medications, and discharge instructions  Description: Complete learning assessment and assess knowledge base.  Interventions:  - Provide teaching at level of understanding  - Provide teaching via preferred learning methods  Outcome: Adequate for Discharge

## 2024-06-15 NOTE — DISCHARGE SUMMARY
[facility]  Discharge- Nura Yañez 1950, 73 y.o. male MRN: [id number]  Unit/Bed#: S -01 Encounter: [id number]  Primary Care Provider: Marisabel Minaya DO   Date and time admitted to hospital: 6/14/2024  1:50 PM    * Chest pain  Assessment & Plan  POA; described as pressure, occurred with minimal exertion; no h/o similar  HEART score 7; ELLA 2  Troponin and telemetry reassuring   Stable for discharge   He will arrange outpatient stress with PCP       Hypertension  Assessment & Plan  BP acceptable   Resume lisinopril     Iron deficiency  Assessment & Plan  Continue outpatient iron infusions         Medical Problems       Resolved Problems  Date Reviewed: 6/15/2024   None       Discharging Physician / Practitioner: Lane Pino PA-C  PCP: Marisabel Minaya DO  Admission Date:   Admission Orders (From admission, onward)       Ordered        06/14/24 1619  Place in Observation  Once                          Discharge Date: 06/15/24    Consultations During Hospital Stay:  None    Procedures Performed:   CXR    Significant Findings / Test Results:   CXR: Hiatal hernia. No acute pulmonary disease     Incidental Findings:   None     Test Results Pending at Discharge (will require follow up):   None     Outpatient Tests Requested:  None    Complications:  None    Reason for Admission: Chest Pain    Hospital Course:   Nura Yañez is a 73 y.o. male patient who originally presented to the hospital on 6/14/2024 due to chest pain. In the ED his work up was reassuring, but his heart score was elevated. He was admitted, troponin was trended, and telemetry was ordered. His troponin remained negative and his telemetry showed no events. Symptoms likely due to iron infusion. He was discharged in stable condition and will follow up with PCP for outpatient stress test.     Hospital Course: No notes on file        Please see above list of diagnoses and related plan for additional information.  "    Condition at Discharge: stable    Discharge Day Visit / Exam:   Subjective:  Patient reports resolution of his symptoms. He is excited to go home.   Vitals: Blood Pressure: 140/82 (06/15/24 0748)  Pulse: 68 (06/15/24 0748)  Temperature: 97.6 °F (36.4 °C) (06/15/24 0748)  Temp Source: Oral (06/15/24 0748)  Respirations: 18 (06/14/24 1837)  Height: 5' 7\" (170.2 cm) (06/14/24 1908)  Weight - Scale: 79.6 kg (175 lb 7.8 oz) (06/14/24 1908)  SpO2: 99 % (06/15/24 0748)  Exam:   Physical Exam  Constitutional:       General: He is not in acute distress.     Appearance: Normal appearance. He is normal weight. He is not ill-appearing or diaphoretic.   HENT:      Head: Normocephalic and atraumatic.      Mouth/Throat:      Mouth: Mucous membranes are moist.   Eyes:      General: No scleral icterus.     Pupils: Pupils are equal, round, and reactive to light.   Cardiovascular:      Rate and Rhythm: Normal rate and regular rhythm.      Pulses: Normal pulses.      Heart sounds: Normal heart sounds, S1 normal and S2 normal. No murmur heard.     No systolic murmur is present.      No diastolic murmur is present.      No gallop. No S3 or S4 sounds.   Pulmonary:      Effort: Pulmonary effort is normal. No accessory muscle usage or respiratory distress.      Breath sounds: Normal breath sounds. No stridor. No decreased breath sounds, wheezing, rhonchi or rales.   Chest:      Chest wall: No tenderness.   Abdominal:      General: Bowel sounds are normal. There is no distension.      Palpations: Abdomen is soft.      Tenderness: There is no abdominal tenderness. There is no guarding.   Musculoskeletal:      Right lower leg: No edema.      Left lower leg: No edema.   Skin:     General: Skin is warm and dry.      Coloration: Skin is not jaundiced.   Neurological:      General: No focal deficit present.      Mental Status: He is alert. Mental status is at baseline.      Motor: No tremor or seizure activity.   Psychiatric:         Behavior: " Behavior is cooperative.          Discussion with Family: Updated  (wife) via phone.    Discharge instructions/Information to patient and family:   See after visit summary for information provided to patient and family.      Provisions for Follow-Up Care:  See after visit summary for information related to follow-up care and any pertinent home health orders.      Mobility at time of Discharge:   Basic Mobility Inpatient Raw Score: 24  JH-HLM Goal: 8: Walk 250 feet or more  JH-HLM Achieved: 8: Walk 250 feet ot more  HLM Goal achieved. Continue to encourage appropriate mobility.     Disposition:   Home    Planned Readmission: None     Discharge Statement:  I spent 45 minutes discharging the patient. This time was spent on the day of discharge. I had direct contact with the patient on the day of discharge. Greater than 50% of the total time was spent examining patient, answering all patient questions, arranging and discussing plan of care with patient as well as directly providing post-discharge instructions.  Additional time then spent on discharge activities.    Discharge Medications:  See after visit summary for reconciled discharge medications provided to patient and/or family.      **Please Note: This note may have been constructed using a voice recognition system**

## 2024-06-15 NOTE — ASSESSMENT & PLAN NOTE
POA; described as pressure, occurred with minimal exertion; no h/o similar  HEART score 7; ELLA 2  Troponin and telemetry reassuring   Stable for discharge   He will arrange outpatient stress with PCP

## 2024-08-06 ENCOUNTER — APPOINTMENT (OUTPATIENT)
Dept: RADIOLOGY | Facility: AMBULARY SURGERY CENTER | Age: 74
End: 2024-08-06
Attending: ORTHOPAEDIC SURGERY
Payer: MEDICARE

## 2024-08-06 ENCOUNTER — OFFICE VISIT (OUTPATIENT)
Dept: OBGYN CLINIC | Facility: CLINIC | Age: 74
End: 2024-08-06
Payer: MEDICARE

## 2024-08-06 VITALS
HEART RATE: 76 BPM | DIASTOLIC BLOOD PRESSURE: 88 MMHG | BODY MASS INDEX: 27.56 KG/M2 | HEIGHT: 67 IN | WEIGHT: 175.6 LBS | SYSTOLIC BLOOD PRESSURE: 153 MMHG

## 2024-08-06 DIAGNOSIS — Z01.89 ENCOUNTER FOR LOWER EXTREMITY COMPARISON IMAGING STUDY: ICD-10-CM

## 2024-08-06 DIAGNOSIS — Z01.818 PREOPERATIVE CLEARANCE: ICD-10-CM

## 2024-08-06 DIAGNOSIS — M19.171 POST-TRAUMATIC ARTHRITIS OF RIGHT ANKLE: ICD-10-CM

## 2024-08-06 DIAGNOSIS — M19.171 POST-TRAUMATIC ARTHRITIS OF RIGHT ANKLE: Primary | ICD-10-CM

## 2024-08-06 DIAGNOSIS — M21.41 PES PLANOVALGUS, ACQUIRED, RIGHT: ICD-10-CM

## 2024-08-06 PROCEDURE — 73630 X-RAY EXAM OF FOOT: CPT

## 2024-08-06 PROCEDURE — 73620 X-RAY EXAM OF FOOT: CPT

## 2024-08-06 PROCEDURE — 73600 X-RAY EXAM OF ANKLE: CPT

## 2024-08-06 PROCEDURE — 99214 OFFICE O/P EST MOD 30 MIN: CPT | Performed by: ORTHOPAEDIC SURGERY

## 2024-08-06 RX ORDER — CHLORHEXIDINE GLUCONATE ORAL RINSE 1.2 MG/ML
15 SOLUTION DENTAL ONCE
OUTPATIENT
Start: 2024-08-06 | End: 2024-08-06

## 2024-08-06 RX ORDER — CHLORHEXIDINE GLUCONATE 40 MG/ML
SOLUTION TOPICAL DAILY PRN
OUTPATIENT
Start: 2024-08-06

## 2024-08-06 NOTE — PATIENT INSTRUCTIONS
James R Lachman, M.D.  Attending, Orthopaedic Surgery  [facility]  [place] Office Phone: [phone] ? Fax: [phone]  [place] Office Phone: [phone] ? Fax:[phone]    : Kayla Whalen MA     Surgery Coordinators [place]: Mavis Hook, [phone]  Katia Olea, [phone]  Surgery Coordinator [place]:  Dayanakamar Mikayla, [phone]                                                       Shreya Root, [phone]                                                            [email or web address]   PRE-OPERATIVE AND POST-OPERATIVE INSTRUCTIONS    General Information:  Your surgery is with Dr. Lachman.  Dates can change (although rare) depending on emergencies.  Typical post operative visits are at the following intervals:  3 weeks post surgery(except 1 week for bunions and wound monitoring), 6 weeks post surgery, 3 months post surgery, 6 months post surgery, and then on a yearly basis.  However, this may change based on Dr. Lachmans’ recommendation.  #1 post-operative rule for foot/ankle surgery:  ONCE YOU ARE OUT OF YOUR CAST AND/OR REMOVABLE BOOT, SWELLING MAY PERSIST FOR MANY MONTHS.  YOU MIGHT ALSO EXPERIENCE A BLUISH DISCOLORATION OF YOUR LEG.  THIS IS NORMAL AND PART OF THE USUAL POSTOPERATIVE EXPERIENCE.    SMOKING:  Smoking results in incomplete healing of fractures (broken bones) and joints that my have been fused.  Smoking and nicotine also prevents the growth of bone into ankle replacements and bone healing.  It also slows the healing of muscles and skin (soft tissue).  Therefore, please do not have surgery if you continue to smoke.  We reserve the right to cancel your surgery if we suspect that you are smoking.  DO NOT use nicorette gum or other patches.  Please find an alternative method to quit smoking before your surgery.    Pre-Operative Information:  Surgery date and preoperative visits:  If you have  medical problems, such as an abnormal EKG, history of BLOOD CLOT, ANEURYSM, and any other heart condition, please inform us so that we can get your medical clearance several weeks before the surgery.  Please bring any important medical information, such as an EKG, chest x-ray, or echocardiogram, with you to ensure that your surgery will not be delayed.  If needed, you will receive your preoperative appointments in the mail or by phone from our scheduling office.  The location of the preoperative appointment will be given to you also.  You may not eat after midnight the night before surgery.  If you do, your surgery will be cancelled.   You will receive a phone call from your surgery center the day before your surgery (if your surgery is on a Monday, you will get a call the Friday before).   If you do not hear from someone by 4pm the day before your surgery, please call the Surgical coordinator (number above) to notify us.  Start taking Vitamin D3 4000 units per day and Calcium 1200mg per day immediately. You will continue this until your 3 month post-op visit. These are over the counter and available at all pharmacies and supermarkets.  FOR THOSE HAVING SURGERY AT [facility]- IF YOU WILL NEED CRUTCHES OR A ROLLING WALKER AFTER SURGERY, ASK FOR A PRESCRIPTION FOR THIS FROM OUR OFFICE TODAY.  THIS CANNOT BE HANDLED THE DAY OF SURGERY AS [facility] DOES NOT STOCK THESE.    Because bacterial can often enter any defect in the skin, it is important to avoid any cuts before surgery.  Any breaks in the skin on the leg will often result in your surgery being postponed.  Please avoid going on a very long walk the day prior to surgery, or doing other activities that could lead to irritation of the skin, including yard work, extra athletic activity, or shaving.   This could result in surgery cancellation.  You MUST be fasting the day of your surgery.  Therefore, please do not consume any foot or beverage  after midnight the night before surgery.  The morning of surgery you may take your usual medications with a sip of water.  It is important not to take anti-inflammatory medication like Ibuprofen, Motrin, Naproxen (Aleve), or Aspirin 7-10 days before surgery because they will make you bleed more than usual.  Vitamin, E, Plavix and Coumadin also have the same effect.  Stop Aspirin and Vitamin E two weeks before surgery.  YOUR MEDICAL DOCTOR SHOULD TELL YOU WHEN TO STOP COUMADIN OR PLAVIX.  If your surgery involves any bone healing, please do not take anti-inflammatories for at least 6 weeks after surgery.  This can impede bone healing (ibuprofen, Aleve, Relafen, iodine).  Tylenol is fine to take.    PREOPERATIVE BATHING INSTRUCTIONS:    Before your surgery, bathe with Hibiclens (4% Chlorhexidene) as instructed below.  This skin cleanser will help reduce the bacteria on your skin before surgery.  To avoid irritating your eyes, do not apply Hibiclens above the level of your neck.  On the evening before AND the morning of surgery, bathe your entire body except the face and scalp, then rinse freely.  DO NOT apply to your face or scalp, as Hibiclens can irritate your eyes.  Purchasing information:   Hibiclens is available without a prescription at most retail pharmacies.     ADDITIONAL INSTRUCTIONS:  PATIENTS HAVING FOOT/ANKLE SURGERY     In preparation for your upcoming surgery, we kindly request and advise the following:  Notify our office if you are taking any of the following:  Coumadin (warfarin):  Persantine (dipyridamole); Pletal (cilostazol); Plavix (clopidogrel); Ticlid (ticlopidine); Agrylin (anagrelide); Aggrenox (dipyridamole and aspirin) or other blood thinners,.  In addition, stop taking Vitamin E and herbal supplements.  Do not schedule any elective dental work for at least 6 months after surgery.  If you had an ankle replacement, you will need to take antibiotics before any future dental procedures. Your  dentist or our office can prescribe these for you.  1000mg of Amoxicillin 1 hour prior to any dental procedure is the recommended dosing.      THREE RULES:    After surgery you will most likely be given the instructions “KEEP YOUR TOES ABOVE YOUR NOSE.”  This means that you MUST have your feet elevated higher than your heart.  Keeping your toes above your nose helps to heal the muscles and skin (soft tissues) by reducing swelling in your leg.  This position also helps to prevent infection, and is very important in avoiding deep venous thrombosis (blood clots).    In order to keep the blood circulating in your legs and in order to avoid deep vein   thrombosis (blood clots), we ask patients to GET UP ONCE AN HOUR during the day.  This means you should at least cross the room and come back.  It does not mean you have to be up for long periods of time.  In most cases we will not have people immediately put any weight on their operated part.  This is important to prevent loosening of metal or other devices holding the bones together.  It also prevents irritation of the soft tissues which can lead to prolonged healing.  When we say get up once an hour, please walk, hop or move with an assisted device.  This is important!    Do not do any excessive walking during the first few days after surgery.  Recovering from surgery is a full-time task for the patient.  Postoperative care is important to avoid irritating the skin incision, which can lead to infection.  Please do not plan activities or go out of town for several weeks after surgery.  If you are unsure about your future activities, please schedule surgery only when you know it is acceptable for you.  Scheduling surgery and then canceling the date, prevents other people from having surgery on that date as it takes time to line everything up effectively.  If you cancel your surgery the week of your planned surgery, we reserve the right to cancel all future surgical  procedures.    THE DAY OF SURGERY:    Arrival to the hospital or outpatient surgical center on time is imperative.  If you arrive late, then your surgery will be cancelled.  You MUST have a family member/friend bring you, stay with you throughout the DURATION of your surgery, and drive you home.  You MUST be fasting the day of your surgery.  Therefore, do not consume any food or beverage after midnight the night before surgery.  At your pre-operative visit with the anesthesia staff, or during your phone screen, a nurse will instruct you what medications you will need to take the day of surgery.  MAKE SURE THAT THE PHARMACY LISTED IN THE ELECTRONIC MEDICAL RECORD (EPIC) IS YOUR PREFERRED PHARMACY. For example, if you are staying with family or a friend, and will not be near your preferred pharmacy, YOU MUST, tell the nurses checking you in the day of surgery so that this can be changed in the system.  If your prescriptions are sent to a pharmacy, this cannot be changed.      AFTER YOUR SURGERY:  Bleeding through the bandage almost always occurs.  Do not let this alarm you.  Simply add more gauze or a towel, call us, and come in for a dressing change.   If you think it is excessive, contact us immediately or go to the local emergency room.    Do not get the bandage wet.  Showering is possible with plastic protectors.   Be very careful, as the bathroom can be wet and slippery.  If you do get your dressing wet, it should be changed immediately.  Please contact us.      ONCE YOUR ARE OUT OF YOUR CAST AND/OR REMOVABLE BOOT, SWELLING MAY PERSIST FOR MANY MONTHS.  YOU MIGHT ALSO EXPERIENCE A BLUISH DISCOLORATION OF YOUR LEG.  THIS IS NORMAL AND PART OF THE USUAL POSTOPERATIVE EXPERIENCE.  WEARING COMPRESSION HOSE (ELASTIC STOCKINGS) CAN HELP AVOID SOME OF THIS SWELLING.      DRESSING:   The purpose of the surgical dressing is to keep your wound and the surgical site protected from the environment.  Most dressings contain  splints, which help to hold your foot and ankle in a corrected position, and also allow the surgical site to heal properly. Dressings will remain in place and undisturbed until the first postop visit.   If you have a drain in place, this will need to be removed in 1-3 days after surgery.  The time for the drain to be pulled will be written on your discharge instruction sheet.    CAST  INSTRUCTIONS:  You may or may not get a cast following surgery.  If you do, pay close attention to the following:    After application of a splint or cast, it is very important to elevate your leg for 24 to 72 hours.   The injured area should be elevated well above the heart.   Remember “Toes above your Nose”.  Rest and elevation greatly reduce pain and speed the healing process by minimizing early swelling.    CALL YOUR DOCTORS OFFICE OR VISIT LOCATION EMERGENCY ROOM IF YOU HAVE ANY OF THE FOLLOWING:    Significant increased pain, which may be caused by swelling, and the feeling that the splint or cast is too tight  Numbness and tingling in your hand or foot, which may be caused by too much pressure on the nerves  Burning and stinging, which may be caused by too much pressure on the skin  Excessive swelling below the cast, which may mean the cast is slowing your blood circulation  Loss of active movement of toes, which request an urgent evaluation  Loss of “capillary refill”.  Pinch the tip of toes and mg the skin.  Release pressure and if the skin does not return pink then call the office immediately.      DO NOT GET YOUR CAST WET.   Bacteria thrive in moist dark areas.  We do not want this.   If your cast becomes wet, return to the office and we will apply another one.    PAIN AFTER SURGERY:  Narcotic pain medication can and will depress your respiratory system if taken in excess.  The goal of pain management with narcotics is to be comfortable not pain free.  If you take enough narcotics to be pain free then you run the risk of  stopping breathing.  If this happens, call 911 immediately!  Pain in the heel is often  caused by pressure from the weight of your foot on the bed.  Make sure your heel is suspended off the bed by keeping a pillow underneath your calf not your knee.    Medications:  You will be given narcotic pain medication. Do NOT drive while taking narcotic medications. Medications such as Darvocet, Percocet, Vicoden or Tylenol #3, also contain acetaminophen (Tylenol). Do not take acetaminophen or Tylenol from home when taking theses medications. When you fill your prescription, you may ask the pharmacist if your pain medication has acetaminophen/Tylenol in it. It is okay to take Tylenol with Oxycontin/Oxycodone. Should you have pain after taking your prescription medication, ibuprophen (Motrin, Advil, and Alleve) is a common over the counter preparation and may often be taken with the prescription pain medication as long as you take them with food. These medications can irritate the stomach lining.   Unless you are allergic to aspirin or currently taking a blood thinner, Dr. Lachmans’ patients are requested to take one 325 mg aspirin every 12 hours until you are back to walking normally after surgery (This can be up to 6 weeks).  Narcotic medications commonly cause nausea. Taking them with food will decrease this side effect. If you are having extreme nausea, please contact us for an alternative medication or for something that can be taken with this medication to decrease the nausea.   Also, narcotic medications frequently cause constipation. An increase of fiber, fruits and vegetables in your diet may alleviate this problem, or if necessary, you may use an over-the-counter medication such as senekot, colace, or Fibercon for constipation problems.   You should resume all medications you were taking prior to the surgery unless otherwise specified.     Activity:   Because of your recent foot surgery, your activity level will  decrease. You will need to elevate your foot ABOVE the level of your heart for a minimum of four days. The length of time necessary for the swelling to go down, and for your wounds to heal properly depends greatly on your efforts here. Elevation is extremely important to avoid compromising the blood supply to your foot. Remember when your foot is down it will swell, which will increase pain and slow healing. Wiggle your toes frequently if possible.   If you go home with a regional block, (a type of anesthesia) the foot and leg will be numb. Think of ways to get into your house and around the house until the block wears off.   Keep in mind that it may be a legal issue if you drive while in a cast or splint, especially when the splint is on the right foot. You may call the [employer] to schedule a road test if you have adaptive equipment applied to your car.   The amount of weight you are allowed to bear on your foot will be written on your discharge sheet filled out at the time of surgery. The following is an explanation of the possibilities:       COVID-19 Policies  [facility] has the following policies when it comes to ELECTIVE surgery  No elective surgery requiring anesthesia until 7 weeks after a patient tested positive for COVID-19   No elective surgery requiring anesthesia until 3 months after a patient was hospitalized for COVID-19      Non-weight bearing:   You are to put NO weight whatsoever on your foot. When using crutches or a walker, your foot should not touch the ground, except when you are standing. Then, it may rest on the ground. If you are to be non-weight bearing, and you are not compliant, you could compromise the surgery.     Some of our patients have been requesting prescriptions for a roll-a-bout knee scooter. [employer] and other insurances have been denying these claims, and you may either have to rent one or pay out of pocket to purchase one.  THIS SHOULD BE  PURCHASED PRIOR TO THE SURGERY AND YOU SHOULD BRING IT WITH YOU THE DAY OF THE SURGERY TO AIDE IN GETTING FROM THE CAR INTO THE HOUSE AFTER SURGERY.

## 2024-08-06 NOTE — PROGRESS NOTES
James R Lachman, M.D.  Attending, Orthopaedic Surgery  Foot and Ankle  [facility]      ORTHOPAEDIC FOOT AND ANKLE CLINIC VISIT     Assessment:     Encounter Diagnoses   Name Primary?    Post-traumatic arthritis of right ankle Yes    Encounter for lower extremity comparison imaging study     Pes planovalgus, acquired, right     Preoperative clearance             Plan:   The patient verbalized understanding of exam findings and treatment plan. We engaged in the shared decision-making process and treatment options were discussed at length with the patient. Surgical and conservative management discussed today along with risks and benefits.  Patient has end stage osteoarthritis of the right ankle. He has failed to respond to conservative treatments and wishes to proceed forward with a total ankle arthroplasty.   Informed consent was obtained today for a RIGHT [place] Total Ankle Replacement with possible medial slide calcaneal osteotomy, possible Cotton osteotomy and possible Ross  Return in about 3 weeks (around 8/27/2024) for postop.    CONSENT FOR TOTAL ANKLE REPLACEMENT (TAR):   Ankle replacement will be pursued and will be the planned procedure but we will also be prepared to perform an ankle and possibly simultaneous hindfoot arthrodesis should the quality of the bone and deformity not lend themselves to ankle replacement surgery. This will be an intraoperative decision.  We have discussed that the deformity may warrant realignment of the foot even if ankle replacement can be performed and realignment would involve osteotomies and possible tendon transfers as well as tendo-Achilles lengthening. The patient understands that we may be able to perform any necessary associated procedures simultaneous to ankle replacement surgery but if there is any concern for the safety of the foot or if this is too much surgery at one time then these procedures may need to be performed in a staged  manner.    Patient understands that there is no guarantee that the surgery will relieve all of their pain and also understands that there will be a course of protected weight-bearing status required which will restrict them from driving and other activities as discussed at today's visit. Patient understands that there is no guarantee that ankle replacement surgery or ankle arthrodesis with or without simultaneous hindfoot arthrodesis will relieve all of his ankle and hindfoot symptoms and allow him to be fully functional. Patient recognizes that there are risks with surgery including bleeding, numbness, nerve irritation, wound complications, infection, continued pain, joint stiffness, malunion, nonunion, anesthetic complications, death, failure of procedure and possible need for further surgery. The patient understands that there is no guarantee that this surgery will relieve all of His pain and symptoms.  Patient understands that there is no guarantee that they will return to full function after the procedure.  Patient has provided informed consent for the procedure.      History of Present Illness:   Chief Complaint:   Chief Complaint   Patient presents with    Follow-up     Follow up of the right foot. Wants to talk about surgery.      Nura Yañez is a 73 y.o. male who is being seen in follow-up for Right ankle arthritis. When we last saw he we recommended WBAT in sneaker.  Pain has not improved. Residual pain is localized at anterior ankle with minimal radiating and described as sharp and severe.      Pain/symptom timing:  Worse during the day when active  Pain/symptom context:  Worse with activites and work  Pain/symptom modifying factors:  Rest makes better, activities make worse  Pain/symptom associated signs/symptoms: none    Prior treatment   NSAIDsYes   Injections Yes   Bracing/Orthotics Yes    Physical Therapy Yes     Orthopedic Surgical History:   See below    Past Medical, Surgical and Social  "History:  Past Medical History:  has no past medical history on file.  Problem List: does not have any pertinent problems on file.  Past Surgical History:  has no past surgical history on file.  Family History: family history is not on file.  Social History:  reports that he has never smoked. He has never used smokeless tobacco. He reports current alcohol use of about 2.0 standard drinks of alcohol per week. He reports that he does not use drugs.  Current Medications: has a current medication list which includes the following prescription(s): ascorbic acid, atorvastatin, ferrous sulfate, ferrous sulfate, lisinopril, pantoprazole, and na sulfate-k sulfate-mg sulf.  Allergies: has No Known Allergies.     Review of Systems:  General- denies fever/chills  HEENT- denies hearing loss or sore throat  Eyes- denies eye pain or visual disturbances, denies red eyes  Respiratory- denies cough or SOB  Cardio- denies chest pain or palpitations  GI- denies abdominal pain  Endocrine- denies urinary frequency  Urinary- denies pain with urination  Musculoskeletal- Negative except noted above  Skin- denies rashes or wounds  Neurological- denies dizziness or headache  Psychiatric- denies anxiety or difficulty concentrating    Physical Exam:   /88   Pulse 76   Ht 5' 7\" (1.702 m)   Wt 79.7 kg (175 lb 9.6 oz)   BMI 27.50 kg/m²   General/Constitutional: No apparent distress: well-nourished and well developed.  Eyes: normal ocular motion  Lymphatic: No appreciable lymphadenopathy  Respiratory: Non-labored breathing  Vascular: No edema, swelling or tenderness, except as noted in detailed exam.  Integumentary: No impressive skin lesions present, except as noted in detailed exam.  Neuro: No ataxia or tremors noted  Psych: Normal mood and affect, oriented to person, place and time. Appropriate affect.  Musculoskeletal: Normal, except as noted in detailed exam and in HPI.    Examination    Right    Gait Antalgic   Musculoskeletal " Tender to palpation at diffusely about the ankle    Skin Normal.    Nails Normal    Range of Motion  5 degrees dorsiflexion, 20 degrees plantarflexion  Subtalar motion: normal    Stability Stable    Muscle Strength 5/5 tibialis anterior  5/5 gastrocnemius-soleus  5/5 posterior tibialis  5/5 peroneal/eversion strength  5/5 EHL  5/5 FHL    Neurologic Normal    Sensation Intact to light touch throughout sural, saphenous, superficial peroneal, deep peroneal and medial/lateral plantar nerve distributions.  [place] 5.07 filament (10g) testing deferred.    Cardiovascular Brisk capillary refill < 2 seconds,intact DP and PT pulses    Special Tests None      Imaging Studies:   5 views of the right ankle/foot were obtained and reviewed today that demonstrate end stage ankle osteoarthritis. Reviewed by me personally.       James R. Lachman, MD  Foot & Ankle Surgery   Department of Orthopaedic Surgery  [facility]      I personally performed the service.    James R. Lachman, MD    Scribe Attestation      I,:  Carlyle Gorman am acting as a scribe while in the presence of the attending physician.:       I,:  James R Lachman, MD personally performed the services described in this documentation    as scribed in my presence.:

## 2024-09-10 RX ORDER — LISINOPRIL 10 MG/1
10 TABLET ORAL DAILY
COMMUNITY

## 2024-09-10 NOTE — PRE-PROCEDURE INSTRUCTIONS
Pre-Surgery Instructions:   Medication Instructions    atorvastatin (LIPITOR) 40 mg tablet Take day of surgery.    Ferrous Sulfate (IRON PO) Stop taking 7 days prior to surgery.    lisinopril (ZESTRIL) 10 mg tablet Hold day of surgery.    pantoprazole (PROTONIX) 40 mg tablet Take day of surgery.    Medication instructions for day surgery reviewed. Please use only a sip of water to take your instructed medications. Avoid all over the counter vitamins, supplements and NSAIDS for one week prior to surgery per anesthesia guidelines. Tylenol is ok to take as needed.     You will receive a call one business day prior to surgery with an arrival time and hospital directions. If your surgery is scheduled on a Monday, the hospital will be calling you on the Friday prior to your surgery. If you have not heard from anyone by 8pm, please call the hospital supervisor through the hospital  at [phone]. ([place] [phone] or [place] [phone]).    Do not eat or drink anything after midnight the night before your surgery, including candy, mints, lifesavers, or chewing gum. Do not drink alcohol 24hrs before your surgery. Try not to smoke at least 24hrs before your surgery.       Follow the pre surgery showering instructions as listed in the “My Surgical Experience Booklet” or otherwise provided by your surgeon's office. Do not use a blade to shave the surgical area 1 week before surgery. It is okay to use a clean electric clippers up to 24 hours before surgery. Do not apply any lotions, creams, including makeup, cologne, deodorant, or perfumes after showering on the day of your surgery. Do not use dry shampoo, hair spray, hair gel, or any type of hair products.     No contact lenses.  Remove all jewelry including rings and body piercing jewelry.     Wear causal clothing that is easy to take on and off. Consider your type of surgery.    Keep any valuables, jewelry, piercings at home. Please bring any specially  ordered equipment (sling, braces) if indicated.    Arrange for a responsible person to drive you to and from the hospital on the day of your surgery. Please confirm the visitor policy for the day of your procedure when you receive your phone call with an arrival time.     Call the surgeon's office with any new illnesses, exposures, or additional questions prior to surgery.    Please reference your “My Surgical Experience Booklet” for additional information to prepare for your upcoming surgery.

## 2024-09-11 NOTE — DISCHARGE INSTR - AVS FIRST PAGE
James R Lachman, M.D.  Attending, Orthopaedic Surgery  [facility]  [place] Office Phone: [phone] ? Fax: [phone]  [place] Office Phone: [phone] ? Fax:[phone]    : Kayla Whalen MA    Surgery Coordinators [place]: Mavis Hook, [phone]  Katia Lefty, [phone]  Surgery Coordinator [place]:  Shila Degroot, [phone]                                                        Shreya Root, [phone]                                                                                                                      [email or web address]   POST-OPERATIVE INSTRUCTIONS    General Information:  Typical post operative visits are at the following intervals:  2-3 weeks post surgery, 6 weeks post surgery, 3 months post surgery, 6 months post surgery, and then on a yearly basis.  However, this may change based on Dr. Lachmans’ recommendation.  #1 post-operative rule for foot/ankle surgery:  ONCE YOU ARE OUT OF YOUR CAST AND/OR REMOVABLE BOOT, SWELLING MAY PERSIST FOR MANY MONTHS.  YOU MIGHT ALSO EXPERIENCE A BLUISH DISCOLORATION OF YOUR LEG.  THIS IS NORMAL AND PART OF THE USUAL POSTOPERATIVE EXPERIENCE.  DO NOT WAIT UNTIL YOUR BLOCK WEARS OFF TO TAKE YOUR PAIN MEDICATION.  IT TAKES A FEW DOSES OF THE PAIN MEDICATION TO REACH A THERAPEUTIC LEVEL.  TAKE A TABLET PROACTIVELY BEFORE YOU HAVE ANY PAIN AND AGAIN 4 HOURS LATER SO WHEN THE BLOCK WEARS OFF, YOU ARE NOT CAUGHT OFF GUARD.    SMOKING:  Smoking results in incomplete healing of fractures (broken bones) and joints that my have been fused.  Smoking and nicotine also prevents the growth of bone into ankle replacements and bone healing.  It also slows the healing of muscles and skin (soft tissue).  Therefore, please do not have surgery if you continue to smoke.  We reserve the right to cancel your surgery if we suspect that you are smoking.  DO NOT use  nicorette gum or other patches.  Please find an alternative method to quit smoking before your surgery and do not restart after surgery to allow for healing.      THREE RULES:    After surgery you will most likely be given the instructions “KEEP YOUR TOES ABOVE YOUR NOSE.”  This means that you MUST have your feet elevated higher than your heart.  Keeping your toes above your nose helps to heal the muscles and skin (soft tissues) by reducing swelling in your leg.  This position also helps to prevent infection, and is very important in avoiding deep venous thrombosis (blood clots).    In order to keep the blood circulating in your legs and in order to avoid deep vein   thrombosis (blood clots), we ask patients to GET UP ONCE AN HOUR during the day.  This means you should at least cross the room and come back.  It does not mean you have to be up for long periods of time.  In most cases we will not have people immediately put any weight on their operated part.  This is important to prevent loosening of metal or other devices holding the bones together.  It also prevents irritation of the soft tissues which can lead to prolonged healing.  When we say get up once an hour, please walk, hop or move with an assisted device.  This is important!    Do not do any excessive walking during the first few days after surgery.  Recovering from surgery is a full-time task for the patient.  Postoperative care is important to avoid irritating the skin incision, which can lead to infection.  Please do not plan activities or go out of town for several weeks after surgery.        AFTER YOUR SURGERY:  Bleeding through the bandage almost always occurs.  Do not let this alarm you.  Simply overwrap with an ABD pad and Ace bandage (The nurses discharging your from the day of surgery will provide this.)   If you think it is excessive, you can come in early for a dressing change (at around 1 week instead of 3 weeks postop.)    Do not get the  bandage wet.  Showering is possible with plastic protectors.   Be very careful, as the bathroom can be wet and slippery.  If you do get your dressing wet, it should be changed immediately.  Please contact us.      ONCE YOUR ARE OUT OF YOUR CAST AND/OR REMOVABLE BOOT, SWELLING MAY PERSIST FOR MANY MONTHS.  THERE WILL ALSO BE A BLUISH DISCOLORATION OF YOUR LEG FOR MONTHS.  THIS IS NORMAL AND PART OF THE USUAL POSTOPERATIVE EXPERIENCE.  WEARING COMPRESSION HOSE (ELASTIC STOCKINGS) CAN HELP AVOID SOME OF THIS SWELLING.    Ice the area 20 minutes every hour once the nerve block wears off. If you are in a cast or a splint, you may need to leave the ice on longer than 20 minutes in order to feel any benefits.       DRESSING:   The purpose of the surgical dressing is to keep your wound and the surgical site protected from the environment.  Most dressings contain splints, which help to hold your foot and ankle in a corrected position, and also allow the surgical site to heal properly. IF YOU GO TO THE EMERGENCY ROOM FOR ANY REASON, AND THEY REMOVE YOUR DRESSING OR SPLINT, YOU MUST BE SEEN IN DR. LACHMANS CL[facility] TO HAVE IT REPLACED) AS SOON AS POSSIBLE (IDEALLY THE NEXT DAY).   If you have a drain in place, this will need to be removed in 1 day after surgery.  The time for the drain to be pulled will be written on your discharge instruction sheet.    CAST  INSTRUCTIONS:  You may or may not get a cast following surgery.  If you do, pay close attention to the following:    After application of a splint or cast, it is very important to elevate your leg for 24 to 72 hours.   The injured area should be elevated well above the heart.   Remember “Toes above your Nose”.  Rest and elevation greatly reduce pain and speed the healing process by minimizing early swelling.    CALL YOUR DOCTORS OFFICE OR VISIT LOCATION EMERGENCY ROOM IF YOU HAVE ANY OF THE FOLLOWING:    Significant increased pain, which may be caused by swelling (Strict  elevation will alleviate this)  Numbness and tingling in your hand or foot, which may be caused by too much pressure on the nerves (There is always numbness after surgery due to nerve blocks)  Burning and stinging, which may be caused by too much pressure on the skin  Excessive swelling below the cast, which may mean the cast is slowing your blood circulation  Loss of active movement of toes, which request an urgent evaluation (if you have had a nerve block, this is an expected thing and totally normal)  Loss of “capillary refill”.  Pinch the tip of toes and mg the skin.  Release pressure and if the skin does not return pink then call the office immediately.      DO NOT GET YOUR CAST WET.   Bacteria thrive in moist dark areas.  We do not want this.   If your cast becomes wet, return to the office and we will apply another one.    PAIN AFTER SURGERY:  Narcotic pain medication can and will depress your respiratory system if taken in excess.  The goal of pain management with narcotics is to be comfortable not pain free.  If you take enough narcotics to be pain free then you run the risk of stopping breathing.  If this happens, call 911 immediately!  Pain in the heel is often  caused by pressure from the weight of your foot on the bed.  Make sure your heel is suspended off the bed by keeping a pillow underneath your calf not your knee.    Medications:  You will be given narcotic pain medication. Do NOT drive while taking narcotic medications. Medications such as Darvocet, Percocet, Vicoden or Tylenol #3, also contain acetaminophen (Tylenol). Do not take acetaminophen or Tylenol from home when taking theses medications. When you fill your prescription, you may ask the pharmacist if your pain medication has acetaminophen/Tylenol in it. It is okay to take Tylenol with Oxycontin/Oxycodone.   Unless you are allergic to aspirin or currently taking a blood thinner, Dr. Lachmans’ patients are requested to take one 325 mg  aspirin every 12 hours until you are back to walking normally after surgery (This can be up to 6 weeks). Ecotrin (Enteric-coated aspirin) is more sensitive to the stomach and we recommend purchasing this instead of regular aspirin to minimize the risk of stomach irritation.  Narcotic medications commonly cause nausea. Taking them with food will decrease this side effect. If you are having extreme nausea, please contact us for an alternative medication or for something that can be taken with this medication to decrease the nausea.   Also, narcotic medications frequently cause constipation. An increase of fiber, fruits and vegetables in your diet may alleviate this problem, or if necessary, you may use an over-the-counter medication such as senekot, colace, or Fibercon for constipation problems.   You should resume all medications you were taking prior to the surgery unless otherwise specified.   If you had fracture surgery, bony surgery like an osteotomy or fusion, or a surgery that requires bone healing, you are advised to take Vitamin D and Calcium to improve healing potential.  Vitamin D3 4000 units/day and Calcium 1200mg/day. These are over the counter medications so please pick them up at the pharmacy when you are picking up your prescriptions.    Activity:   Because of your recent foot surgery, your activity level will decrease. You will need to elevate your foot ABOVE the level of your heart for a minimum of four days. The length of time necessary for the swelling to go down, and for your wounds to heal properly depends greatly on your efforts here. Elevation is extremely important to avoid compromising the blood supply to your foot. Remember when your foot is down it will swell, which will increase pain and slow healing. Wiggle your toes frequently if possible.   If you go home with a regional block, (a type of anesthesia) the foot and leg will be numb. Think of ways to get into your house and around the  house until the block wears off.   Keep in mind that it may be a legal issue if you drive while in a cast or splint, especially when the splint is on the right foot. You may call the Department of Motor Vehicles to schedule a road test if you have adaptive equipment applied to your car.   The amount of weight you are allowed to bear on your foot will be written on your discharge sheet filled out at the time of surgery. The following is an explanation of the possibilities:     Non-weight bearing:   You are to put NO weight whatsoever on your foot. When using crutches or a walker, your foot should not touch the ground, except when you are standing. Then, it may rest on the ground. If you are to be non-weight bearing, and you are not compliant, you could compromise the surgery.     Some of our patients have been requesting prescriptions for a roll-a-bout knee scooter. [employer] and other insurances have been denying these claims, and you may either have to rent one or pay out of pocket to purchase one.

## 2024-09-14 ENCOUNTER — ANESTHESIA EVENT (OUTPATIENT)
Dept: PERIOP | Facility: HOSPITAL | Age: 74
End: 2024-09-14
Payer: MEDICARE

## 2024-09-16 ENCOUNTER — APPOINTMENT (OUTPATIENT)
Dept: RADIOLOGY | Facility: HOSPITAL | Age: 74
End: 2024-09-16
Payer: MEDICARE

## 2024-09-16 ENCOUNTER — ANESTHESIA (OUTPATIENT)
Dept: PERIOP | Facility: HOSPITAL | Age: 74
End: 2024-09-16
Payer: MEDICARE

## 2024-09-16 ENCOUNTER — HOSPITAL ENCOUNTER (OUTPATIENT)
Facility: HOSPITAL | Age: 74
Setting detail: OUTPATIENT SURGERY
Discharge: HOME | End: 2024-09-16
Attending: ORTHOPAEDIC SURGERY | Admitting: ORTHOPAEDIC SURGERY
Payer: MEDICARE

## 2024-09-16 VITALS
WEIGHT: 176.4 LBS | HEIGHT: 67 IN | HEART RATE: 84 BPM | BODY MASS INDEX: 27.69 KG/M2 | TEMPERATURE: 97.2 F | SYSTOLIC BLOOD PRESSURE: 123 MMHG | RESPIRATION RATE: 19 BRPM | DIASTOLIC BLOOD PRESSURE: 66 MMHG | OXYGEN SATURATION: 95 %

## 2024-09-16 DIAGNOSIS — M19.171 POST-TRAUMATIC ARTHRITIS OF RIGHT ANKLE: Primary | ICD-10-CM

## 2024-09-16 PROBLEM — K44.9 HIATAL HERNIA: Status: ACTIVE | Noted: 2024-09-16

## 2024-09-16 PROBLEM — K21.9 GASTROESOPHAGEAL REFLUX DISEASE: Status: ACTIVE | Noted: 2024-09-16

## 2024-09-16 PROCEDURE — 27687 REVISION OF CALF TENDON: CPT | Performed by: ORTHOPAEDIC SURGERY

## 2024-09-16 PROCEDURE — 27702 RECONSTRUCT ANKLE JOINT: CPT | Performed by: ORTHOPAEDIC SURGERY

## 2024-09-16 PROCEDURE — C1713 ANCHOR/SCREW BN/BN,TIS/BN: HCPCS | Performed by: ORTHOPAEDIC SURGERY

## 2024-09-16 PROCEDURE — 27687 REVISION OF CALF TENDON: CPT | Performed by: PHYSICIAN ASSISTANT

## 2024-09-16 PROCEDURE — 73610 X-RAY EXAM OF ANKLE: CPT

## 2024-09-16 PROCEDURE — C1776 JOINT DEVICE (IMPLANTABLE): HCPCS | Performed by: ORTHOPAEDIC SURGERY

## 2024-09-16 PROCEDURE — 27702 RECONSTRUCT ANKLE JOINT: CPT | Performed by: PHYSICIAN ASSISTANT

## 2024-09-16 PROCEDURE — C9290 INJ, BUPIVACAINE LIPOSOME: HCPCS | Performed by: STUDENT IN AN ORGANIZED HEALTH CARE EDUCATION/TRAINING PROGRAM

## 2024-09-16 PROCEDURE — NC001 PR NO CHARGE: Performed by: ORTHOPAEDIC SURGERY

## 2024-09-16 DEVICE — FIXED BEARING TIBIAL PLATE,  3D+ TIBIA
Type: IMPLANTABLE DEVICE | Site: ANKLE | Status: FUNCTIONAL
Brand: VANTAGE

## 2024-09-16 DEVICE — FIXED BEARING TIBIAL INSERT
Type: IMPLANTABLE DEVICE | Site: ANKLE | Status: FUNCTIONAL
Brand: VANTAGE®, ACTIVIT-E

## 2024-09-16 DEVICE — FIXED BEARING TIBIAL LOCKING CLIP,  3D/3D+ TIBIA
Type: IMPLANTABLE DEVICE | Site: ANKLE | Status: FUNCTIONAL
Brand: VANTAGE

## 2024-09-16 DEVICE — IMPLANTABLE DEVICE
Type: IMPLANTABLE DEVICE | Site: ANKLE | Status: FUNCTIONAL
Brand: VANTAGE

## 2024-09-16 RX ORDER — OXYCODONE HYDROCHLORIDE 5 MG/1
5 TABLET ORAL ONCE AS NEEDED
Status: DISCONTINUED | OUTPATIENT
Start: 2024-09-16 | End: 2024-09-16 | Stop reason: HOSPADM

## 2024-09-16 RX ORDER — CHLORHEXIDINE GLUCONATE 40 MG/ML
SOLUTION TOPICAL DAILY PRN
Status: DISCONTINUED | OUTPATIENT
Start: 2024-09-16 | End: 2024-09-16 | Stop reason: HOSPADM

## 2024-09-16 RX ORDER — ASPIRIN 325 MG
325 TABLET ORAL 2 TIMES DAILY
Qty: 84 TABLET | Refills: 0 | Status: SHIPPED | OUTPATIENT
Start: 2024-09-16 | End: 2024-10-28

## 2024-09-16 RX ORDER — MIDAZOLAM HYDROCHLORIDE 2 MG/2ML
INJECTION, SOLUTION INTRAMUSCULAR; INTRAVENOUS
Status: COMPLETED | OUTPATIENT
Start: 2024-09-16 | End: 2024-09-16

## 2024-09-16 RX ORDER — EPHEDRINE SULFATE 50 MG/ML
INJECTION INTRAVENOUS AS NEEDED
Status: DISCONTINUED | OUTPATIENT
Start: 2024-09-16 | End: 2024-09-16

## 2024-09-16 RX ORDER — CHLORHEXIDINE GLUCONATE ORAL RINSE 1.2 MG/ML
15 SOLUTION DENTAL ONCE
Status: COMPLETED | OUTPATIENT
Start: 2024-09-16 | End: 2024-09-16

## 2024-09-16 RX ORDER — FENTANYL CITRATE/PF 50 MCG/ML
25 SYRINGE (ML) INJECTION
Status: DISCONTINUED | OUTPATIENT
Start: 2024-09-16 | End: 2024-09-16 | Stop reason: HOSPADM

## 2024-09-16 RX ORDER — BUPIVACAINE HYDROCHLORIDE 2.5 MG/ML
INJECTION, SOLUTION EPIDURAL; INFILTRATION; INTRACAUDAL
Status: COMPLETED | OUTPATIENT
Start: 2024-09-16 | End: 2024-09-16

## 2024-09-16 RX ORDER — ONDANSETRON 2 MG/ML
INJECTION INTRAMUSCULAR; INTRAVENOUS AS NEEDED
Status: DISCONTINUED | OUTPATIENT
Start: 2024-09-16 | End: 2024-09-16

## 2024-09-16 RX ORDER — VANCOMYCIN HYDROCHLORIDE 1 G/20ML
INJECTION, POWDER, LYOPHILIZED, FOR SOLUTION INTRAVENOUS AS NEEDED
Status: DISCONTINUED | OUTPATIENT
Start: 2024-09-16 | End: 2024-09-16 | Stop reason: HOSPADM

## 2024-09-16 RX ORDER — BUPIVACAINE HYDROCHLORIDE 5 MG/ML
INJECTION, SOLUTION EPIDURAL; INTRACAUDAL
Status: COMPLETED | OUTPATIENT
Start: 2024-09-16 | End: 2024-09-16

## 2024-09-16 RX ORDER — ONDANSETRON 4 MG/1
4 TABLET, ORALLY DISINTEGRATING ORAL EVERY 8 HOURS PRN
Qty: 15 TABLET | Refills: 0 | Status: SHIPPED | OUTPATIENT
Start: 2024-09-16

## 2024-09-16 RX ORDER — MAGNESIUM HYDROXIDE 1200 MG/15ML
LIQUID ORAL AS NEEDED
Status: DISCONTINUED | OUTPATIENT
Start: 2024-09-16 | End: 2024-09-16 | Stop reason: HOSPADM

## 2024-09-16 RX ORDER — FENTANYL CITRATE 50 UG/ML
INJECTION, SOLUTION INTRAMUSCULAR; INTRAVENOUS
Status: COMPLETED | OUTPATIENT
Start: 2024-09-16 | End: 2024-09-16

## 2024-09-16 RX ORDER — CEFAZOLIN SODIUM 2 G/50ML
2000 SOLUTION INTRAVENOUS ONCE
Status: COMPLETED | OUTPATIENT
Start: 2024-09-16 | End: 2024-09-16

## 2024-09-16 RX ORDER — ONDANSETRON 2 MG/ML
4 INJECTION INTRAMUSCULAR; INTRAVENOUS ONCE AS NEEDED
Status: DISCONTINUED | OUTPATIENT
Start: 2024-09-16 | End: 2024-09-16 | Stop reason: HOSPADM

## 2024-09-16 RX ORDER — DEXAMETHASONE SODIUM PHOSPHATE 10 MG/ML
INJECTION, SOLUTION INTRAMUSCULAR; INTRAVENOUS AS NEEDED
Status: DISCONTINUED | OUTPATIENT
Start: 2024-09-16 | End: 2024-09-16

## 2024-09-16 RX ORDER — PHENYLEPHRINE HCL IN 0.9% NACL 1 MG/10 ML
SYRINGE (ML) INTRAVENOUS AS NEEDED
Status: DISCONTINUED | OUTPATIENT
Start: 2024-09-16 | End: 2024-09-16

## 2024-09-16 RX ORDER — HYDROMORPHONE HCL/PF 1 MG/ML
SYRINGE (ML) INJECTION AS NEEDED
Status: DISCONTINUED | OUTPATIENT
Start: 2024-09-16 | End: 2024-09-16

## 2024-09-16 RX ORDER — LIDOCAINE HCL/PF 100 MG/5ML
SYRINGE (ML) INJECTION AS NEEDED
Status: DISCONTINUED | OUTPATIENT
Start: 2024-09-16 | End: 2024-09-16

## 2024-09-16 RX ORDER — PROPOFOL 10 MG/ML
INJECTION, EMULSION INTRAVENOUS AS NEEDED
Status: DISCONTINUED | OUTPATIENT
Start: 2024-09-16 | End: 2024-09-16

## 2024-09-16 RX ORDER — OXYCODONE HYDROCHLORIDE 5 MG/1
5 TABLET ORAL EVERY 6 HOURS PRN
Qty: 30 TABLET | Refills: 0 | Status: SHIPPED | OUTPATIENT
Start: 2024-09-16

## 2024-09-16 RX ORDER — SODIUM CHLORIDE, SODIUM LACTATE, POTASSIUM CHLORIDE, CALCIUM CHLORIDE 600; 310; 30; 20 MG/100ML; MG/100ML; MG/100ML; MG/100ML
125 INJECTION, SOLUTION INTRAVENOUS CONTINUOUS
Status: DISCONTINUED | OUTPATIENT
Start: 2024-09-16 | End: 2024-09-16 | Stop reason: HOSPADM

## 2024-09-16 RX ADMIN — HYDROMORPHONE HYDROCHLORIDE 0.25 MG: 1 INJECTION, SOLUTION INTRAMUSCULAR; INTRAVENOUS; SUBCUTANEOUS at 10:38

## 2024-09-16 RX ADMIN — HYDROMORPHONE HYDROCHLORIDE 0.25 MG: 1 INJECTION, SOLUTION INTRAMUSCULAR; INTRAVENOUS; SUBCUTANEOUS at 10:28

## 2024-09-16 RX ADMIN — Medication 100 MCG: at 08:34

## 2024-09-16 RX ADMIN — Medication 100 MCG: at 09:31

## 2024-09-16 RX ADMIN — MIDAZOLAM 1 MG: 1 INJECTION INTRAMUSCULAR; INTRAVENOUS at 07:38

## 2024-09-16 RX ADMIN — EPHEDRINE SULFATE 10 MG: 50 INJECTION INTRAVENOUS at 09:00

## 2024-09-16 RX ADMIN — Medication 200 MCG: at 08:45

## 2024-09-16 RX ADMIN — FENTANYL CITRATE 25 MCG: 50 INJECTION, SOLUTION INTRAMUSCULAR; INTRAVENOUS at 08:51

## 2024-09-16 RX ADMIN — SODIUM CHLORIDE, SODIUM LACTATE, POTASSIUM CHLORIDE, AND CALCIUM CHLORIDE 125 ML/HR: .6; .31; .03; .02 INJECTION, SOLUTION INTRAVENOUS at 07:15

## 2024-09-16 RX ADMIN — Medication 100 MCG: at 09:15

## 2024-09-16 RX ADMIN — DEXAMETHASONE SODIUM PHOSPHATE 10 MG: 10 INJECTION, SOLUTION INTRAMUSCULAR; INTRAVENOUS at 08:40

## 2024-09-16 RX ADMIN — ONDANSETRON 4 MG: 2 INJECTION INTRAMUSCULAR; INTRAVENOUS at 08:49

## 2024-09-16 RX ADMIN — EPHEDRINE SULFATE 5 MG: 50 INJECTION INTRAVENOUS at 09:15

## 2024-09-16 RX ADMIN — BUPIVACAINE HYDROCHLORIDE 10 ML: 2.5 INJECTION, SOLUTION EPIDURAL; INFILTRATION; INTRACAUDAL; PERINEURAL at 07:38

## 2024-09-16 RX ADMIN — BUPIVACAINE 15 ML: 13.3 INJECTION, SUSPENSION, LIPOSOMAL INFILTRATION at 07:38

## 2024-09-16 RX ADMIN — FENTANYL CITRATE 50 MCG: 50 INJECTION, SOLUTION INTRAMUSCULAR; INTRAVENOUS at 07:38

## 2024-09-16 RX ADMIN — FENTANYL CITRATE 25 MCG: 50 INJECTION, SOLUTION INTRAMUSCULAR; INTRAVENOUS at 10:07

## 2024-09-16 RX ADMIN — LIDOCAINE HYDROCHLORIDE 100 MG: 20 INJECTION INTRAVENOUS at 08:31

## 2024-09-16 RX ADMIN — SODIUM CHLORIDE, SODIUM LACTATE, POTASSIUM CHLORIDE, AND CALCIUM CHLORIDE: .6; .31; .03; .02 INJECTION, SOLUTION INTRAVENOUS at 10:38

## 2024-09-16 RX ADMIN — BUPIVACAINE HYDROCHLORIDE 15 ML: 5 INJECTION, SOLUTION EPIDURAL; INTRACAUDAL; PERINEURAL at 07:38

## 2024-09-16 RX ADMIN — MIDAZOLAM 1 MG: 1 INJECTION INTRAMUSCULAR; INTRAVENOUS at 08:24

## 2024-09-16 RX ADMIN — CEFAZOLIN SODIUM 2000 MG: 2 SOLUTION INTRAVENOUS at 08:24

## 2024-09-16 RX ADMIN — CHLORHEXIDINE GLUCONATE 15 ML: 1.2 RINSE ORAL at 07:15

## 2024-09-16 RX ADMIN — PROPOFOL 200 MG: 10 INJECTION, EMULSION INTRAVENOUS at 08:31

## 2024-09-16 NOTE — ANESTHESIA PREPROCEDURE EVALUATION
Procedure:  ARTHROPLASTY ANKLE, Possible Medial slide calcaneal osteotomy, possible cotton osteotomy, possible Stayer (Right: Ankle)    Relevant Problems   ANESTHESIA (within normal limits)      CARDIO   (+) Hyperlipidemia   (+) Hypertension      GI/HEPATIC   (+) Hiatal hernia      /RENAL   (+) Prostate cancer (HCC)      MUSCULOSKELETAL   (+) Hiatal hernia      PULMONARY (within normal limits)   (-) Sleep apnea   (-) Smoking   (-) URI (upper respiratory infection)      Physical Exam    Airway    Mallampati score: I  TM Distance: <3 FB  Neck ROM: full     Dental   Comment: Narrow palate; none loose     Cardiovascular      Pulmonary      Other Findings      CBC 8/30/24 Hgb 15.3, Plt 222    Lab Results   Component Value Date    SODIUM 139 07/24/2024    K 4.4 07/24/2024    BUN 14 07/24/2024    CREATININE 0.88 07/24/2024    EGFR 92 07/24/2024    GLUCOSE 106 02/19/2014     Lab Results   Component Value Date    INR 1.1 03/29/2024     Blood type O    Lab Results   Component Value Date    HGBA1C 4.5 03/30/2024    HGBA1C  03/30/2024     Unable to determine A1c due to interfering substance, reflexed to alternate methodology.     Anesthesia Plan  ASA Score- 2     Anesthesia Type- general with ASA Monitors.         Additional Monitors:     Airway Plan: LMA.    Comment: Nerve blocks with exparel as per surgeon request.       Plan Factors-Exercise tolerance (METS): >4 METS.    Chart reviewed.   Existing labs reviewed. Patient summary reviewed.    Patient is not a current smoker.              Induction- intravenous.    Postoperative Plan-         Informed Consent- Anesthetic plan and risks discussed with patient and spouse.  I personally reviewed this patient with the CRNA. Discussed and agreed on the Anesthesia Plan with the CRNA..

## 2024-09-16 NOTE — ANESTHESIA PROCEDURE NOTES
Peripheral Block    Patient location during procedure: holding area  Start time: 9/16/2024 7:36 AM  Reason for block: at surgeon's request and post-op pain management  Staffing  Performed by: Batsheva Cobb MD  Authorized by: Batsheva Cobb MD    Preanesthetic Checklist  Completed: patient identified, IV checked, site marked, risks and benefits discussed, surgical consent, monitors and equipment checked, pre-op evaluation and timeout performed  Peripheral Block  Patient position: left lateral  Prep: ChloraPrep  Patient monitoring: frequent blood pressure checks, continuous pulse oximetry and heart rate  Block type: Popliteal  Laterality: right  Injection technique: single-shot  Procedures: ultrasound guided, Ultrasound guidance required for the procedure to increase accuracy and safety of medication placement and decrease risk of complications.  Ultrasound permanent image saved  bupivacaine (PF) (MARCAINE) 0.5 % injection 20 mL - Perineural   15 mL - 9/16/2024 7:38:00 AM  bupivacaine liposomal (EXPAREL) 1.3 % injection 20 mL - Perineural   15 mL - 9/16/2024 7:38:00 AM  midazolam (VERSED) injection 0.5 mg - Intravenous   1 mg - 9/16/2024 7:38:00 AM  fentanyl citrate (PF) 100 MCG/2ML 50 mcg - Intravenous   50 mcg - 9/16/2024 7:38:00 AM (lido 1% 3 ml to skin)  Needle  Needle type: Stimuplex   Needle gauge: 20 G  Needle length: 4 in  Needle localization: ultrasound guidance  Assessment  Injection assessment: frequent aspiration, injected with ease, negative aspiration, no paresthesia on injection, no symptoms of intraneural/intravenous injection, negative for heart rate change, needle tip visualized at all times and incremental injection  Paresthesia pain: none  Post-procedure:  site cleaned  patient tolerated the procedure well with no immediate complications  Additional Notes  US imaged saved.  Exparel bracelet applied.  Pt's wife present, pt conversant and comfortable throughout

## 2024-09-16 NOTE — ANESTHESIA POSTPROCEDURE EVALUATION
Post-Op Assessment Note    CV Status:  Stable  Pain Score: 0    Pain management: adequate       Mental Status:  Alert and awake   Hydration Status:  Euvolemic   PONV Controlled:  Controlled   Airway Patency:  Patent     Post Op Vitals Reviewed: Yes    No anethesia notable event occurred.    Staff: CRNA             /66 (09/16/24 1117)    Temp   97.4   Pulse 84 (09/16/24 1117)   Resp 19 (09/16/24 1117)    SpO2 95 % (09/16/24 1117)

## 2024-09-16 NOTE — ANESTHESIA PROCEDURE NOTES
Left message to call back and go over providers message.    Hetal Mane RN     Peripheral Block    Patient location during procedure: holding area  Start time: 9/16/2024 7:38 AM  Reason for block: at surgeon's request and post-op pain management  Staffing  Performed by: Batsheva Cobb MD  Authorized by: Batsheva Cobb MD    Preanesthetic Checklist  Completed: patient identified, IV checked, site marked, risks and benefits discussed, surgical consent, monitors and equipment checked, pre-op evaluation and timeout performed  Peripheral Block  Patient position: supine  Prep: ChloraPrep  Patient monitoring: continuous pulse oximetry, frequent blood pressure checks and heart rate  Block type: Adductor Canal  Laterality: right  Injection technique: single-shot  Procedures: ultrasound guided, Ultrasound guidance required for the procedure to increase accuracy and safety of medication placement and decrease risk of complications.  Ultrasound permanent image saved  bupivacaine (PF) (MARCAINE) 0.25 % injection 20 mL - Perineural   10 mL - 9/16/2024 7:38:00 AM  bupivacaine liposomal (EXPAREL) 1.3 % injection 20 mL - Perineural   5 mL - 9/16/2024 7:38:00 AM (lido 1% 2 ml to skin)  Needle  Needle type: Stimuplex   Needle gauge: 20 G  Needle length: 4 in  Needle localization: ultrasound guidance  Assessment  Injection assessment: frequent aspiration, incremental injection, injected with ease, needle tip visualized at all times, negative aspiration, negative for heart rate change, no symptoms of intraneural/intravenous injection and no paresthesia on injection  Paresthesia pain: none  Post-procedure:  site cleaned  patient tolerated the procedure well with no immediate complications

## 2024-09-16 NOTE — H&P
James R Lachman, M.D.  Attending, Orthopaedic Surgery  Foot and Ankle  [facility]        ORTHOPAEDIC FOOT AND ANKLE H+P     Assessment:   Right ankle replacement with possible medial slide calcaneal osteotomy, possible cotton           Plan:   The patient verbalized understanding of exam findings and treatment plan. We engaged in the shared decision-making process and treatment options were discussed at length with the patient. Surgical and conservative management discussed today along with risks and benefits.  Right ankle replacement with possible MCO and possible cotton  Consent in chart  CONSENT FOR TOTAL ANKLE REPLACEMENT (TAR):   Ankle replacement will be pursued and will be the planned procedure but we will also be prepared to perform an ankle and possibly simultaneous hindfoot arthrodesis should the quality of the bone and deformity not lend themselves to ankle replacement surgery. This will be an intraoperative decision.  We have discussed that the deformity may warrant realignment of the foot even if ankle replacement can be performed and realignment would involve osteotomies and possible tendon transfers as well as tendo-Achilles lengthening. The patient understands that we may be able to perform any necessary associated procedures simultaneous to ankle replacement surgery but if there is any concern for the safety of the foot or if this is too much surgery at one time then these procedures may need to be performed in a staged manner.    Patient understands that there is no guarantee that the surgery will relieve all of their pain and also understands that there will be a course of protected weight-bearing status required which will restrict them from driving and other activities as discussed at today's visit. Patient understands that there is no guarantee that ankle replacement surgery or ankle arthrodesis with or without simultaneous hindfoot arthrodesis will relieve all of  his ankle and hindfoot symptoms and allow him to be fully functional. Patient recognizes that there are risks with surgery including bleeding, numbness, nerve irritation, wound complications, infection, continued pain, joint stiffness, malunion, nonunion, anesthetic complications, death, failure of procedure and possible need for further surgery. The patient understands that there is no guarantee that this surgery will relieve all of His pain and symptoms.  Patient understands that there is no guarantee that they will return to full function after the procedure.  Patient has provided informed consent for the procedure.                History of Present Illness:   Chief Complaint: Right ankle arthritis  Nura Yañez is a 73 y.o. male who is being seen for right ankle arthritis. Patient has failed all conservative treatment measures.  Pain is localized at anterior ankle with minimal radiating and described as sharp and severe. Patient denies numbness, tingling or radicular pain.  Denies history of neuropathy.  Patient does not smoke, does not have diabetes and does not take blood thinners.  Patient denies family history of anesthesia complications and has not had any complications with anesthesia.     Pain/symptom timing:  Worse during the day when active  Pain/symptom context:  Worse with activites and work  Pain/symptom modifying factors:  Rest makes better, activities make worse  Pain/symptom associated signs/symptoms: none    Prior treatment   NSAIDsYes    Injections Yes   Bracing/Orthotics Yes   Physical Therapy Yes     Orthopedic Surgical History:   See below    Past Medical, Surgical and Social History:  Past Medical History:  has a past medical history of Anemia, Arthritis, History of radiation therapy, Hyperlipidemia, Hypertension, Kidney stone, and Prostate cancer (HCC).  Problem List: does not have any pertinent problems on file.  Past Surgical History:  has a past surgical history that includes Ankle  "arthroplasty (Left); Tonsillectomy; Cystoscopy w/ ureteral stent placement; Cystoscopy w/ ureteral stent removal; Colonoscopy; and Prostate biopsy.  Family History: family history is not on file.  Social History:  reports that he has never smoked. He has never used smokeless tobacco. He reports current alcohol use of about 2.0 standard drinks of alcohol per week. He reports that he does not use drugs.  Current Medications: Scheduled Meds:  Current Facility-Administered Medications   Medication Dose Route Frequency Provider Last Rate    bupivacaine liposomal  20 mL Infiltration Once Batsheva Cobb MD      cefazolin  2,000 mg Intravenous Once James R Lachman, MD      chlorhexidine gluconate   Topical Daily PRN James R Lachman, MD      lactated ringers  125 mL/hr Intravenous Continuous Batsheva Cobb  mL/hr (09/16/24 0715)     Continuous Infusions:lactated ringers, 125 mL/hr, Last Rate: 125 mL/hr (09/16/24 0715)      PRN Meds:.  chlorhexidine gluconate    Allergies: has No Known Allergies.     Review of Systems:  General- denies fever/chills  HEENT- denies hearing loss or sore throat  Eyes- denies eye pain or visual disturbances, denies red eyes  Respiratory- denies cough or SOB  Cardio- denies chest pain or palpitations  GI- denies abdominal pain  Endocrine- denies urinary frequency  Urinary- denies pain with urination  Musculoskeletal- Negative except noted above  Skin- denies rashes or wounds  Neurological- denies dizziness or headache  Psychiatric- denies anxiety or difficulty concentrating    Physical Exam:   BP (!) 176/81   Pulse 73   Temp 97.6 °F (36.4 °C) (Temporal)   Resp 18   Ht 5' 7\" (1.702 m)   Wt 80 kg (176 lb 6.4 oz)   SpO2 93%   BMI 27.63 kg/m²   General/Constitutional: No apparent distress: well-nourished and well developed.  Eyes: normal ocular motion  Cardio: RRR, Normal S1S2, No m/r/g  Lymphatic: No appreciable lymphadenopathy  Respiratory: Non-labored breathing, CTA b/l no " w/c/r  Abdominal: Soft and nontender  Vascular: No edema, swelling or tenderness, except as noted in detailed exam.  Integumentary: No impressive skin lesions present, except as noted in detailed exam.  Neuro: No ataxia or tremors noted  Psych: Normal mood and affect, oriented to person, place and time. Appropriate affect.  Musculoskeletal: Normal, except as noted in detailed exam and in HPI.    Examination    Right    Gait Antalgic   Musculoskeletal Tender to palpation at anterior ankle    Skin Normal.      Nails Normal    Range of Motion  0 degrees dorsiflexion, 30 degrees plantarflexion  Subtalar motion: normal    Stability Stable    Muscle Strength 5/5 tibialis anterior  5/5 gastrocnemius-soleus  5/5 posterior tibialis  5/5 peroneal/eversion strength  5/5 EHL  5/5 FHL    Neurologic Normal    Sensation Intact to light touch throughout sural, saphenous, superficial peroneal, deep peroneal and medial/lateral plantar nerve distributions.  [place]-Jay 5.07 filament (10g) testing deferred.    Cardiovascular Brisk capillary refill < 2 seconds,intact DP and PT pulses    Special Tests None      Imaging Studies:   None new        James R. Lachman, MD  Foot & Ankle Surgery   Department of Orthopaedic Surgery  [facility]      I personally performed the service.    James R. Lachman, MD

## 2024-09-16 NOTE — OP NOTE
OPERATIVE REPORT  PATIENT NAME: Nura Yañez    :  1950  MRN: [id number]  Pt Location:  OR ROOM 02    SURGERY DATE: 2024    Surgeons and Role:     * James R Lachman, MD - Primary  MITCH Flannery- assisting    Preop Diagnosis:  Post-traumatic arthritis of right ankle [M19.171]  Pes planovalgus, acquired, right [M21.41]    Post-Op Diagnosis Codes:     * Post-traumatic arthritis of right ankle [M19.171]     * Pes planovalgus, acquired, right [M21.41]    Procedure(s):  Right - ARTHROPLASTY ANKLE. Possible Medial slide calcaneal osteotomy. possible cotton osteotomy. possible Stayer    Specimen(s):  * No specimens in log *    Estimated Blood Loss:   Minimal    Drains:  * No LDAs found *    Anesthesia Type:   Choice    Operative Indications:  Post-traumatic arthritis of right ankle [M19.171]  Pes planovalgus, acquired, right [M21.41]      Operative Findings:  Consistent with diagnosis      Complications:   None    Procedure and Technique:  [place] Ankle replacement  Ross Gastrocnemius recession  Fluoroscopy without benefit of radiologist  Placement into short leg nonweightbearing fiberglass cast.      Surgical preparation  After informed consent and preoperative medical clearance were obtained, the patient was taken to the preop holding area, where a regional block was performed. Patient was given appropriate perioperative IV antibiotics, taken to the operating room, and placed supine on the operating table. Further anesthesia was administered without complication (see anesthesia report). The operative extremity was prepped and draped in sterile fashion. The operative lower extremity was exsanguinated with an elastic bandage and a proximally applied thigh tourniquet was inflated. Timeout was performed with the attending surgeon in the room.      Approach through placement of trial implants  A longitudinal incision was made on the anterior aspect of the ankle and careful soft tissue dissection was  maintained. The superficial peroneal nerve was protected throughout the procedure. The extensor retinaculum was divided longitudinally, and the deep neurovascular bundle and extensor tendons were protected throughout the procedure. The anterior capsule was divided and reflected. The joint was exposed. There was extensive arthritis throughout the ankle. Osteophytes were removed from the anterior ankle. The ankle was mobilized. The osteophytes were removed medially and laterally, and soft tissue was cleared from the medial and lateral gutters.       A stab incision was made in the proximal tibia, and from this, an external tibial alignment guide was suspended. We adjusted the proper position for the guide while the and pinned it in place. Rotation was carefully controlled based on the axis of rotation in the sagittal plane of the talus. The resection level was then properly set, and based on intraoperative fluoroscopy, this was confirmed. The tibial cutting block was then positioned and properly pinned at the optimal resection level and confirmed fluoroscopically. There was no violation of the medial malleolus suggested with the pinning of the tibial capture guide. Two pins were placed and the medial and lateral aspects of the tibial plafond were then weakened w/ the dedicated drill pin. With the soft tissues well protected, the oscillating saw was then utilized carefully to perform the tibial preparation. The reciprocating saw was used to make the vertical cut at the medial plafond. The capture guide was removed and the resected bone was evacuated from the joint.    With the ankle in appropriate neutral dorsiflexion position, and the hindfoot in physiologic valgus, the talar resection guide was pinned in proper position - this guide's position was confirmed on intraop fluoro interpretation.  Using the oscillating saw and protecting the medial and lateral components and soft tissues, the talar bone/dome was ressected.   The external tibial alignment guide was then removed and the resected talar bone was evacuated from the joint.    Thorough irrigation was performed with copious amounts of sterile saline mixed with antibiotics. The talar sizing guide was then placed and pinned - proper position confirmed fluoroscopically. Chamfer cutting guide placed.  Chamfer cuts and milled surface prepared.  Guide removed, resected bone evacuated.  Rasp used to prepared talar dome.  Trial talus placed and proper position confirmed clinically and fluoroscopically.  Peg holes for the talus were reamed.  Trial tibia was placed then a trial poly placed - appropriate thickness of the poly was confirmed on clinical exam.  Intraop fluoro confirmed proper trial component position.  Some of the bone was resected from the posteromedial fibula and posterolateral medial malleolus in order to ensure that there was appropriate position for the tibial trial. The appropriately sized tibial trial was utilized with the polyethylene that provided optimal stability and there was 5 degrees of DF beyond neutral.       Placement of final implant through closure  Intraoperative fluoroscopy confirmed appropriate position for the components, and no evidence for stress fracture. The tibial trial was thus pinned in place, and there was no evidence for posterior liftoff on intraoperative fluoroscopy and no suggestion of stress fracture. Trial talus and trial poly removed.  Tibia prepared with punches and tibial trial removed.      Thorough irrigation was performed with copious amounts of sterile saline mixed with antibiotics. The tibial component was placed and properly impacted - proper position was confirmed on intraop fluoro.   The talar was impacted and its proper position confirmed on intraop fluoro.  Cement used.  Trial poly placed. The ankle could be dorsiflexed to only neutral, to 0 degrees and was stable to varus - valgus stress.     He had an equinus contracture  and could not be dorsiflexed past neutral so the decision was made to lengthen his gastrocnemius.  A 2.5cm incision was made 10cm above the medial malleolus medially in the leg. We used blunt dissection to encounter the fascia of the superficial posterior compartment. We entered this compartment using a knife and the  the gastrocnemius tendon from the intact soleus muscle belly.  We released the gastrocnemius tendon by carefully exposing it using two [employer] retractions and only cutting under direct visualization.  Once we completed the gastrocnemius recession, we then carefully dorsiflexed the ankle and were able to achieve 20 degrees ankle dorsiflexion.      The poly was inserted and locking clip used to lock poly in position.   Intraop fluoroscopy suggested no posterior lift off of the tibial component. Thorough irrigation was again performed with copious amounts of sterile saline mixed w/ abx. Intraoperative fluoroscopy confirmed appropriate position of the components, both in the coronal and sagittal planes, including no posterior liftoff. There was no suggestion of stress fracture. The ankle was noted to be stable to varus/valgus stress and had satisfactory dorsiflexion. Vancomycin powder was placed into the wound bed.     The subcutaneous layer was then thoroughly irrigated with copious amounts of sterile saline. The superficial peroneal nerve was protected throughout the procedure. The subcutaneous layer was then reapproximated using vicryl suture in interrupted fashion, and the skin was reapproximated using nylon suture for the anterior wound w/ interrupted fashion for anterior wound and a tensionless closure. The pin in the proximal tibia was removed, and this wound was irrigated and closed with nylon suture. Sterile dressings were applied and with abundant padding with the ankle in neutral position, a shortleg splint was applied. Satisfactory capillary refill remained in all toes.      The  patient tolerated the procedure well and was awakened from anesthesia without complication. He was taken to the recovery room in stable condition.      I was present for the entire procedure., A qualified resident physician was not available., and A physician assistant was required during the procedure for retraction, tissue handling, dissection and suturing.    Patient Disposition:  PACU              SIGNATURE: James R Lachman, MD  DATE: September 16, 2024  TIME: 8:13 AM

## 2024-09-23 ENCOUNTER — TELEPHONE (OUTPATIENT)
Age: 74
End: 2024-09-23

## 2024-09-23 NOTE — TELEPHONE ENCOUNTER
Called and spoke w/pt's wife, Shelby, and she states that pt's block wore off finally on Saturday afternoon.  He is experienced some pulling sensation on his achilles and took a tylenol.  Has been fine since.  Has been elevating and using his Rolator to get around. He is taking his ASA. He is bored.  He had on 9/16/24 Arthroplasty R ankle w/Dr Lachman.  No further questions/concerns.

## 2024-10-08 ENCOUNTER — OFFICE VISIT (OUTPATIENT)
Dept: OBGYN CLINIC | Facility: CLINIC | Age: 74
End: 2024-10-08

## 2024-10-08 VITALS — BODY MASS INDEX: 27.62 KG/M2 | HEIGHT: 67 IN | WEIGHT: 176 LBS

## 2024-10-08 DIAGNOSIS — M19.171 POST-TRAUMATIC ARTHRITIS OF RIGHT ANKLE: Primary | ICD-10-CM

## 2024-10-08 PROCEDURE — 99024 POSTOP FOLLOW-UP VISIT: CPT | Performed by: ORTHOPAEDIC SURGERY

## 2024-10-08 NOTE — PROGRESS NOTES
James R Lachman, M.D.  Attending, Orthopaedic Surgery  Foot and Ankle  [facility]      ORTHOPAEDIC FOOT AND ANKLE POST-OP VISIT     Procedure:      Right [place] Total Ankle Arthroplasty with Ross       Date of surgery:   9/16/24      PLAN  1. Weightbearing Status - PWB operative extremity with progression to WBAT in CAM boot  2. DVT prophylaxis - ASA 325mg BID  3. Continue to elevate 23 hrs/day getting up 1x per hour to prevent a blood clot  4. Pain control - OTC pain medication  5. RTC in 3 weeks  6. Xrays needed next visit - Yes total ankle series    History of Present Illness:   Chief Complaint:   Chief Complaint   Patient presents with    Post-op     Post op 3 weeks. Splint off and stiches out.   ARTHROPLASTY ANKLE, Ross - Right       Nura Yañez is a 73 y.o. male who is being seen for 3 week post-operative visit for the above procedure. Pain is well controlled and the patient has successfully transitioned to OTC pain medicines.  he is taking ASA 325mg BID for DVT prophylaxis. Patient has been NWB in a Short leg cast      Review of Systems:  General- denies fever/chills  Respiratory- denies cough or SOB  Cardio- denies chest pain or palpitations  GI- denies abdominal pain  Musculoskeletal- Negative except noted above  Skin- denies rashes or wounds    Physical Exam:   There were no vitals taken for this visit.  General/Constitutional: No apparent distress: well-nourished and well developed.  Eyes: normal ocular motion  Lymphatic: No appreciable lymphadenopathy  Respiratory: Non-labored breathing  Vascular: No edema, swelling or tenderness, except as noted in detailed exam.  Integumentary: No impressive skin lesions present, except as noted in detailed exam.  Neuro: No ataxia or tremors noted  Psych: Normal mood and affect, oriented to person, place and time. Appropriate affect.  Musculoskeletal: Normal, except as noted in detailed exam and in HPI.    Examination    right         Incision Clean, dry, intact  Sutures Removed this visit    Ecchymosis none    Swelling mild    Sensation Intact to light touch throughout sural, saphenous, superficial peroneal, deep peroneal and medial/lateral plantar nerve distributions.  [place]-Jay 5.07 filament (10g) testing deferred.    Cardiovascular Brisk capillary refill < 2 seconds,intact DP and PT pulses    Special Tests None      Imaging Studies:   No new images    Scribe Attestation      I,:  Carlyle Gorman am acting as a scribe while in the presence of the attending physician.:       I,:  Carlyle Gorman personally performed the services described in this documentation    as scribed in my presence.:                James R. Lachman, MD  Foot & Ankle Surgery   Department of Orthopaedic Surgery  [facility]      I personally performed the service.    James R. Lachman, MD

## 2024-10-08 NOTE — PATIENT INSTRUCTIONS
4 days of partial weight bearing 50%  Then, 4 days of partial weight bearing 75%  Then, 4 days of partial weight bearing 90%  Then full weight bearing in the boot and wean your crutches/rolling walker.    Then begin a 2 week period of weightbearing as tolerated in the CAM boot. We will see you back the week of 10/27    It is essential to follow this protocol and not modify this.  No matter when you begin walking on the boot, it will be difficult and there will be swelling and soreness.      Elevation, Ice and tylenol and staying off of it at night will be important to aide in this transition out of the boot. Swelling and soreness are normal as you begin to do more with the injured leg.     Continue aspirin/lovenox for blood clot prevention  May shower, do not soak in a tub/pool/ocean/etc for another 4 weeks.  Scar massage- pea sized amount of lotion, massage into scar for 5 minutes each day.  Compression stocking (Knee high, 20-30mm Hg) to be worn at all times while awake.  Recommend taking the following supplements: Vitamin D 4000 units per day and Calcium 1200 mg per day. This will help with bone healing.       Wear the boot at all times except when showering, even to sleep at night.

## 2024-10-29 ENCOUNTER — OFFICE VISIT (OUTPATIENT)
Dept: OBGYN CLINIC | Facility: CLINIC | Age: 74
End: 2024-10-29

## 2024-10-29 ENCOUNTER — APPOINTMENT (OUTPATIENT)
Dept: RADIOLOGY | Facility: AMBULARY SURGERY CENTER | Age: 74
End: 2024-10-29
Attending: ORTHOPAEDIC SURGERY
Payer: MEDICARE

## 2024-10-29 VITALS
HEIGHT: 67 IN | WEIGHT: 176 LBS | DIASTOLIC BLOOD PRESSURE: 60 MMHG | SYSTOLIC BLOOD PRESSURE: 126 MMHG | BODY MASS INDEX: 27.62 KG/M2

## 2024-10-29 DIAGNOSIS — M19.171 POST-TRAUMATIC ARTHRITIS OF RIGHT ANKLE: ICD-10-CM

## 2024-10-29 DIAGNOSIS — Z01.89 ENCOUNTER FOR LOWER EXTREMITY COMPARISON IMAGING STUDY: ICD-10-CM

## 2024-10-29 DIAGNOSIS — M19.171 POST-TRAUMATIC ARTHRITIS OF RIGHT ANKLE: Primary | ICD-10-CM

## 2024-10-29 PROCEDURE — 99024 POSTOP FOLLOW-UP VISIT: CPT | Performed by: ORTHOPAEDIC SURGERY

## 2024-10-29 PROCEDURE — 73600 X-RAY EXAM OF ANKLE: CPT

## 2024-10-29 PROCEDURE — 73610 X-RAY EXAM OF ANKLE: CPT

## 2024-10-29 NOTE — PATIENT INSTRUCTIONS
You may begin weaning your boot and transitioning to a sneaker (10/29/24). It is important to do this gradually to avoid aggravating the healing process.    10/29, you may come out of the boot into a sneaker for 1 hours.  2. 10/30, you may come out of the boot into a sneaker for 2 hours,  3. The next day, you may come out of the boot into a sneaker for 3 hours.  4. Continue this (adding 1 hours per day) as you tolerate. For example, if you do 6 hours out of the boot into a sneaker and your foot swells more than usual at night and it is difficult to control the discomfort, do not advance to 8 hours the next day, stay at 7 hours until you are able to tolerate it.    It is essential to follow this protocol and not modify this.  No matter when you begin weaning the boot, it will be difficult and there will be swelling and soreness.  If you spend more time than is recommended in the boot, this process becomes longer and more painful.    Elevation, Ice and tylenol and staying off of it at night will be important to aide in this transition out of the boot. Swelling and soreness are normal as you begin to do more with the injured leg.    May DC aspirin/lovenox, no longer needed.  May shower  Scar massage- pea sized amount of lotion, massage into scar for 5 minutes each day.  Compression stocking (Knee high, 20-30mm Hg) to be worn at all times while awake.  Recommend taking the following supplements: Vitamin D 4000 units per day and Calcium 1200 mg per day. This will help with bone healing.

## 2024-10-29 NOTE — PROGRESS NOTES
"    James R Lachman, M.D.  Attending, Orthopaedic Surgery  Foot and Ankle  [facility]      ORTHOPAEDIC FOOT AND ANKLE POST-OP VISIT     Procedure:     Right [place] Total Ankle Arthroplasty with Ross       Date of surgery:   9/16/24    PLAN  1. Weightbearing Status- WBAT operative extremity  2. DVT prophylaxis- completed  3. Continue PT/HEP  4. Pain control- OTC pain medication  5. RTC in 6 weeks  6. Xrays needed next visit - yes Weightbearing Ankle    History of Present Illness:   Chief Complaint:   Chief Complaint   Patient presents with    Post-op     Post op 6 weeks.   ARTHROPLASTY ANKLE, Ross - Right     Nura Yañez is a 73 y.o. male who is being seen for post-operative visit for the above procedure. Pain is well controlled and the patient has successfully transitioned to OTC pain medicines.  he is taking ASA 325mg BID for DVT prophylaxis. Patient has been WBAT in a CAM boot.      Review of Systems:  General- denies fever/chills  Respiratory- denies cough or SOB  Cardio- denies chest pain or palpitations  GI- denies abdominal pain  Musculoskeletal- Negative except noted above  Skin- denies rashes or wounds    Physical Exam:   /60   Ht 5' 7\" (1.702 m)   Wt 79.8 kg (176 lb)   BMI 27.57 kg/m²   General/Constitutional: No apparent distress: well-nourished and well developed.  Eyes: normal ocular motion  Lymphatic: No appreciable lymphadenopathy  Respiratory: Non-labored breathing  Vascular: No edema, swelling or tenderness, except as noted in detailed exam.  Integumentary: No impressive skin lesions present, except as noted in detailed exam.  Neuro: No ataxia or tremors noted  Psych: Normal mood and affect, oriented to person, place and time. Appropriate affect.  Musculoskeletal: Normal, except as noted in detailed exam and in HPI.    Examination    right        Incision Clean, dry, intact  Sutures Previously removed.    Ecchymosis none    Swelling mild    Sensation Intact " to light touch throughout sural, saphenous, superficial peroneal, deep peroneal and medial/lateral plantar nerve distributions.  [place]-Jay 5.07 filament (10g) testing deferred.    Cardiovascular Brisk capillary refill < 2 seconds,intact DP and PT pulses    Special Tests None      Imaging Studies:   3 views of the ankle were taken, reviewed and interpreted independently that demonstrate total ankle arthroplasty in expected position without signs of loosening. Reviewed by me personally.      James R. Lachman, MD  Foot & Ankle Surgery   Department of Orthopaedic Surgery  [facility] personally performed the service.    James R. Lachman, MD

## 2024-12-10 ENCOUNTER — APPOINTMENT (OUTPATIENT)
Dept: RADIOLOGY | Facility: AMBULARY SURGERY CENTER | Age: 74
End: 2024-12-10
Attending: ORTHOPAEDIC SURGERY
Payer: MEDICARE

## 2024-12-10 ENCOUNTER — OFFICE VISIT (OUTPATIENT)
Dept: OBGYN CLINIC | Facility: CLINIC | Age: 74
End: 2024-12-10

## 2024-12-10 VITALS
SYSTOLIC BLOOD PRESSURE: 126 MMHG | WEIGHT: 173 LBS | HEIGHT: 67 IN | DIASTOLIC BLOOD PRESSURE: 60 MMHG | BODY MASS INDEX: 27.15 KG/M2

## 2024-12-10 DIAGNOSIS — M19.171 POST-TRAUMATIC ARTHRITIS OF RIGHT ANKLE: Primary | ICD-10-CM

## 2024-12-10 DIAGNOSIS — M19.171 POST-TRAUMATIC ARTHRITIS OF RIGHT ANKLE: ICD-10-CM

## 2024-12-10 DIAGNOSIS — Z01.89 ENCOUNTER FOR LOWER EXTREMITY COMPARISON IMAGING STUDY: ICD-10-CM

## 2024-12-10 PROCEDURE — 99024 POSTOP FOLLOW-UP VISIT: CPT | Performed by: ORTHOPAEDIC SURGERY

## 2024-12-10 PROCEDURE — 73610 X-RAY EXAM OF ANKLE: CPT

## 2024-12-10 PROCEDURE — 73600 X-RAY EXAM OF ANKLE: CPT

## 2024-12-10 NOTE — PATIENT INSTRUCTIONS
Total Ankle Replacement Care    -No impact activity (No running, no jumping, no mogul skiing, etc.)    -Wear orthotics in all shoes at all times if prescribed. If no orthotics necessary, supportive shoes at all times.*    -Call our office for a prescription for antibiotics prior to any dental work/procedure    -Keep your follow-up visits.  Total ankle replacements need to be monitored at regular intervals to catch any potential issues early.    -Use compression stockings as needed to help control swelling    -Do not ignore sudden onset redness or swelling in the ankle. Come in to the clinic for evaluation right away if you have sudden onset redness or swelling in the ankle different from your typical swelling        Nicholson, New Balance, Hoka are good brands but I recommend going to a dedicate shoe store (not Foot [employer]ocker or [employer].) At these types of stores, they have experts that can fit you for shoes appropriate for your foot problem. Shoe choice is essential to solving/improving most types of foot pain.  Even after a surgery, good shoes are necessary to keep the foot as comfortable as possible.    [employer]  [address] [facility] [address]  [phone]    [address], [place] [address]  [phone]    [employer]  [address] [place] [address] [phone]    [employer]   [address] [facility]  [phone]    [employer]  [address], [place] [address]  [phone]    [employer]  [address] [place] [address]  [phone]    [facility] [employer]   [address] [place] [address]  [phone]    [employer]  [address] [place], [place]  [phone]    [employer] [address], [address]  [phone]    [employer] [address], [place] [address] [phone]

## 2024-12-10 NOTE — PROGRESS NOTES
James R Lachman, M.D.  Attending, Orthopaedic Surgery  Foot and Ankle  [facility]      ORTHOPAEDIC FOOT AND ANKLE POST-OP VISIT     Procedure:      Right [place] Total Ankle Arthroplasty with Ross       Date of surgery:   9/16/24      PLAN  1. Weightbearing Status - WBAT operative extremity  2. DVT prophylaxis - Completed  3. Continue compression stocking for swelling control  4. Pain control - OTC pain medication  5. RTC in 3 month(s)  6. Xrays needed next visit - Yes total ankle series    History of Present Illness:   Chief Complaint:   Chief Complaint   Patient presents with    Post-op     Post op 3 months.  Everything going well. Has some pain.   ARTHROPLASTY ANKLE, Ross - Right       Nura Yañez is a 74 y.o. male who is being seen for 3 month post-operative visit for the above procedure. Pain is well controlled and the patient has successfully transitioned to OTC pain medicines.  he has completed ASA 325mg BID for DVT prophylaxis. Patient has been WBAT in a Supportive sneaker      Review of Systems:  General- denies fever/chills  Respiratory- denies cough or SOB  Cardio- denies chest pain or palpitations  GI- denies abdominal pain  Musculoskeletal- Negative except noted above  Skin- denies rashes or wounds    Physical Exam:   There were no vitals taken for this visit.  General/Constitutional: No apparent distress: well-nourished and well developed.  Eyes: normal ocular motion  Lymphatic: No appreciable lymphadenopathy  Respiratory: Non-labored breathing  Vascular: No edema, swelling or tenderness, except as noted in detailed exam.  Integumentary: No impressive skin lesions present, except as noted in detailed exam.  Neuro: No ataxia or tremors noted  Psych: Normal mood and affect, oriented to person, place and time. Appropriate affect.  Musculoskeletal: Normal, except as noted in detailed exam and in HPI.    Examination    right        Incision Clean, dry, intact  Sutures  Previously removed.    Ecchymosis none    Swelling mild    Sensation Intact to light touch throughout sural, saphenous, superficial peroneal, deep peroneal and medial/lateral plantar nerve distributions.  [place]-Jay 5.07 filament (10g) testing deferred.    Cardiovascular Brisk capillary refill < 2 seconds,intact DP and PT pulses    Special Tests None      Imaging Studies:   6 views of the right ankle were taken, reviewed and interpreted independently that demonstrate hardware in expected position without signs of failure or loosening. Reviewed by me personally.      Scribe Attestation      I,:  Carlyle Gorman am acting as a scribe while in the presence of the attending physician.:       I,:  James R Lachman, MD personally performed the services described in this documentation    as scribed in my presence.:                James R. Lachman, MD  Foot & Ankle Surgery   Department of Orthopaedic Surgery  [facility]      I personally performed the service.    James R. Lachman, MD

## 2024-12-26 ENCOUNTER — TELEPHONE (OUTPATIENT)
Dept: OBGYN CLINIC | Facility: CLINIC | Age: 74
End: 2024-12-26

## 2024-12-26 ENCOUNTER — TELEPHONE (OUTPATIENT)
Age: 74
End: 2024-12-26

## 2024-12-26 DIAGNOSIS — M19.171 POST-TRAUMATIC ARTHRITIS OF RIGHT ANKLE: Primary | ICD-10-CM

## 2024-12-26 RX ORDER — SULFAMETHOXAZOLE AND TRIMETHOPRIM 800; 160 MG/1; MG/1
1 TABLET ORAL EVERY 12 HOURS SCHEDULED
Qty: 14 TABLET | Refills: 0 | Status: SHIPPED | OUTPATIENT
Start: 2024-12-26 | End: 2024-12-26 | Stop reason: CLARIF

## 2024-12-26 NOTE — TELEPHONE ENCOUNTER
Pictures attached that were sent to my email [email or web address]. Please review and advise. Earlier message states scar is more red, warm to touch and painful

## 2024-12-26 NOTE — TELEPHONE ENCOUNTER
Caller: Jessica     Doctor: Lachman     Reason for call: Patients wife states he had surgery 09/16/2024 with Dr Lachman. Patient noticed some swelling, as well as the scar looks more red than usual and is warm to the touch and patient is experiencing more pain. Please advise.    Call back#: [phone]

## 2024-12-26 NOTE — TELEPHONE ENCOUNTER
Patient wife will send pic thru MyChart,  also given Alejandrina's email address just in case she needs it.

## 2024-12-26 NOTE — TELEPHONE ENCOUNTER
Called and spoke to patient regarding swelling of ankle. His is 3 months s/p right ankle replacement. He reports that the swelling started about 4 days ago, ankle is slightly warm to touch but not notably hot, and notes slight redness of incision. In discussion, patient reports he has been more active that past few days given the holiday and also was not elevating his ankle as much. Suspect swelling is likely due to increased activity, thus recommend patient cut back on his activity and focus on rest and elevation. I will touch base with patient tomorrow to see how he is doing and we will plan to see patient next Tuesday.

## 2024-12-27 ENCOUNTER — TELEPHONE (OUTPATIENT)
Dept: OTHER | Facility: HOSPITAL | Age: 74
End: 2024-12-27

## 2024-12-27 NOTE — TELEPHONE ENCOUNTER
Called patient to check how ankle was feeling today. He notes his ankle is stable, denies worsening of symptoms. Denies fevers/chills. Will focus on resting and elevating ankle over the weekend. Will plan to see patient in clinic next week. No further questions.

## 2024-12-30 ENCOUNTER — TELEPHONE (OUTPATIENT)
Dept: OBGYN CLINIC | Facility: CLINIC | Age: 74
End: 2024-12-30

## 2024-12-30 NOTE — TELEPHONE ENCOUNTER
Called and left a message to schedule an appt with Dr. Lachman tomorrow 12/31/24. If you have any question please message me when scheduling him.

## 2024-12-31 ENCOUNTER — TELEPHONE (OUTPATIENT)
Age: 74
End: 2024-12-31

## 2024-12-31 ENCOUNTER — OFFICE VISIT (OUTPATIENT)
Dept: OBGYN CLINIC | Facility: CLINIC | Age: 74
End: 2024-12-31
Payer: MEDICARE

## 2024-12-31 VITALS — HEIGHT: 67 IN | WEIGHT: 173 LBS | BODY MASS INDEX: 27.15 KG/M2

## 2024-12-31 DIAGNOSIS — M19.171 POST-TRAUMATIC ARTHRITIS OF RIGHT ANKLE: Primary | ICD-10-CM

## 2024-12-31 PROCEDURE — 99213 OFFICE O/P EST LOW 20 MIN: CPT | Performed by: ORTHOPAEDIC SURGERY

## 2024-12-31 RX ORDER — CEPHALEXIN 500 MG/1
500 CAPSULE ORAL EVERY 6 HOURS SCHEDULED
Qty: 20 CAPSULE | Refills: 0 | Status: SHIPPED | OUTPATIENT
Start: 2024-12-31 | End: 2025-01-05

## 2024-12-31 NOTE — TELEPHONE ENCOUNTER
Caller: Jessica.Spouse    Doctor: Lachman    Reason for call: The patient received 2 medications from the pharmacy. One was Keflex and the other was Bactrim. Questioned why the patient was prescribed Bactrim? Please cb to advise    Call back#: [phone]

## 2024-12-31 NOTE — PATIENT INSTRUCTIONS
Begin 5-day course of antibiotics and take as prescribed.     Total Ankle Replacement Care    -No impact activity (No running, no jumping, no mogul skiing, etc.)    -Wear orthotics in all shoes at all times if prescribed. If no orthotics necessary, supportive shoes at all times.*    -Call our office for a prescription for antibiotics prior to any dental work/procedure    -Keep your follow-up visits.  Total ankle replacements need to be monitored at regular intervals to catch any potential issues early.    -Use compression stockings as needed to help control swelling    -Do not ignore sudden onset redness or swelling in the ankle. Come in to the clinic for evaluation right away if you have sudden onset redness or swelling in the ankle different from your typical swelling        Nicholson, New Balance, Hoka are good brands but I recommend going to a dedicate shoe store (not Foot Locker or [employer].) At these types of stores, they have experts that can fit you for shoes appropriate for your foot problem. Shoe choice is essential to solving/improving most types of foot pain.  Even after a surgery, good shoes are necessary to keep the foot as comfortable as possible.    [employer]  [address] [facility] [address]  [phone]    [employer]  [address] [facility] [address], [place] [address] [phone]    [employer]  [address] [place] [address] [phone]    [employer]   [address] [facility]  [phone]    [employer]  [address], [place], [place] [address] [phone]    [employer]  [address] [place], [place] [id number] [phone]    [facility] [facility]   [address] [place] [address] [phone]    [employer]  [address] [place]  [phone]    [employer] [address], [address] [phone]    [facility]  [address] [facility], [address], [place]  [address]   [phone]

## 2024-12-31 NOTE — PROGRESS NOTES
James R Lachman, M.D.  Attending, Orthopaedic Surgery  Foot and Ankle  [facility]      ORTHOPAEDIC FOOT AND ANKLE CLINIC VISIT     Assessment:     Encounter Diagnosis   Name Primary?    Post-traumatic arthritis of right ankle Yes     3.5 months out from right [place] Total ankle replacement with rodriguez        Plan:   The patient verbalized understanding of exam findings and treatment plan. We engaged in the shared decision-making process and treatment options were discussed at length with the patient. Surgical and conservative management discussed today along with risks and benefits.  Presents today for evaluation of increased swelling of right ankle x 1 week. Reports increased activity during the holidays and was not elevating as much. Per patient and patient's wife, swelling and redness have improved.   On exam today, mild expected swelling noted, mild incisional erythema. No significant warmth. No pain with ROM  Based on history and exam, low suspicion for acute infection.   As a precaution, will begin 5 course of keflex for mild incisional cellulitis   He is very happy with his total ankle replacement and is now walking pain free  Continue with proper shoe wear with orthotics if prescribed  Activity to tolerance.  Avoid impact activity  Xrays today demonstrate well aligned total ankle replacement without signs of hardware failure or loosening  Return in about  3 months for repeat evaluation      History of Present Illness:   Chief Complaint:   Chief Complaint   Patient presents with    Right Ankle - Follow-up     Jovani Yañez is a 74 y.o. male who is being seen in follow-up for right post traumatic ankle arthritis, now 3.5 months s/p TAA. When we last met we recommended proper shoe wear, gradually return to activity to tolerance.  Pain has 100% improved. No residual pain present. Denies significant pain, denies fevers/chills.      Pain/symptom timing:  Not longer painful during the  day  Pain/symptom context:  Able to tolerate activities and work  Pain/symptom modifying factors:  Returning to activities   Pain/symptom associated signs/symptoms: none    Prior treatment   NSAIDsYes   Injections No   Bracing/Orthotics Yes    Physical Therapy No     Orthopedic Surgical History:   See below     Past Medical, Surgical and Social History:  Past Medical History:    Past Medical History:   Diagnosis Date    Anemia     Arthritis     History of radiation therapy     Hyperlipidemia     Hypertension     Kidney stone     Prostate cancer (HCC)      Past Surgical History:    Past Surgical History:   Procedure Laterality Date    ANKLE ARTHROPLASTY Left     COLONOSCOPY      CYSTOSCOPY W/ URETERAL STENT PLACEMENT      CYSTOSCOPY W/ URETERAL STENT REMOVAL      OK ARTHROPLASTY ANKLE W/IMPLANT Right 9/16/2024    Procedure: ARTHROPLASTY ANKLE, Ross;  Surgeon: James R Lachman, MD;  Location:  MAIN OR;  Service: Orthopedics    PROSTATE BIOPSY      TONSILLECTOMY       Family History: History reviewed. No pertinent family history.  Social History:    Social History     Socioeconomic History    Marital status: /Civil Union     Spouse name: Not on file    Number of children: Not on file    Years of education: Not on file    Highest education level: Not on file   Occupational History    Not on file   Tobacco Use    Smoking status: Never    Smokeless tobacco: Never   Vaping Use    Vaping status: Never Used   Substance and Sexual Activity    Alcohol use: Yes     Alcohol/week: 2.0 standard drinks of alcohol     Types: 2 Cans of beer per week    Drug use: Never    Sexual activity: Not on file   Other Topics Concern    Not on file   Social History Narrative    Not on file     Social Drivers of Health     Financial Resource Strain: Low Risk  (3/29/2024)    [facility]    Overall Financial Resource Strain (CARDIA)     Difficulty of Paying Living Expenses: Not  very hard   Food Insecurity: No Food Insecurity (3/30/2024)    [facility]    Hunger Vital Sign     Worried About Running Out of Food in the Last Year: Never true     Ran Out of Food in the Last Year: Never true   Transportation Needs: No Transportation Needs (3/30/2024)    [facility]    PRAPARE - Transportation     Lack of Transportation (Medical): No     Lack of Transportation (Non-Medical): No   Physical Activity: Not on file   Stress: No Stress Concern Present (6/19/2023)    [facility], [facility]    [place] of Occupational Health - Occupational Stress Questionnaire     Feeling of Stress : Not at all   Social Connections: Socially Integrated (6/19/2023)    [facility]    Social Connection and Isolation Panel [NHANES]     Frequency of Communication with Friends and Family: More than three times a week     Frequency of Social Gatherings with Friends and Family: More than three times a week     Attends Oriental orthodox Services: More than 4 times per year     Active Member of Clubs or Organizations: Yes     Attends Club or Organization Meetings: More than 4 times per year     Marital Status:    Intimate Partner Violence: Not At Risk (3/30/2024)    [facility]    Humiliation, Afraid, Rape, and Kick questionnaire     Fear of Current or Ex-Partner: No     Emotionally Abused: No     Physically Abused: No     Sexually Abused: No   Housing Stability: Low Risk  (3/30/2024)    [facility]    Housing Stability Vital Sign     Unable to Pay for Housing in the Last Year: No     Number of Places Lived in the Last Year: 1     Unstable Housing in the Last Year: No  "    Current Medications:   Current Outpatient Medications on File Prior to Visit   Medication Sig Dispense Refill    atorvastatin (LIPITOR) 40 mg tablet Take 40 mg by mouth daily at bedtime      lisinopril (ZESTRIL) 10 mg tablet Take 10 mg by mouth daily      pantoprazole (PROTONIX) 40 mg tablet Take 40 mg by mouth daily      aspirin 325 mg tablet Take 1 tablet (325 mg total) by mouth 2 (two) times a day 84 tablet 0    Ferrous Sulfate (IRON PO) Take by mouth (Patient not taking: Reported on 12/10/2024)      ondansetron (ZOFRAN-ODT) 4 mg disintegrating tablet Take 1 tablet (4 mg total) by mouth every 8 (eight) hours as needed for nausea or vomiting 15 tablet 0    oxyCODONE (Roxicodone) 5 immediate release tablet Take 1 tablet (5 mg total) by mouth every 6 (six) hours as needed for moderate pain Max Daily Amount: 20 mg 30 tablet 0     No current facility-administered medications on file prior to visit.     Allergies: No Known Allergies      Review of Systems:  General- denies fever/chills  HEENT- denies hearing loss or sore throat  Eyes- denies eye pain or visual disturbances, denies red eyes  Respiratory- denies cough or SOB  Cardio- denies chest pain or palpitations  GI- denies abdominal pain  Endocrine- denies urinary frequency  Urinary- denies pain with urination  Musculoskeletal- Negative except noted above  Skin- denies rashes or wounds  Neurological- denies dizziness or headache  Psychiatric- denies anxiety or difficulty concentrating    Physical Exam:   Ht 5' 7\" (1.702 m)   Wt 78.5 kg (173 lb)   BMI 27.10 kg/m²   General/Constitutional: No apparent distress: well-nourished and well developed.  Eyes: normal ocular motion  Lymphatic: No appreciable lymphadenopathy  Respiratory: Non-labored breathing  Vascular: No edema, swelling or tenderness, except as noted in detailed exam.  Integumentary: No impressive skin lesions present, except as noted in detailed exam.  Neuro: No ataxia or tremors noted  Psych: Normal " mood and affect, oriented to person, place and time. Appropriate affect.  Musculoskeletal: Normal, except as noted in detailed exam and in HPI.    Examination    right    Gait Normal   Musculoskeletal  No ttp    Skin Mild swelling ankle. Well-healed incisions.  Mild erythema of incision     Nails Normal    Range of Motion  20 degrees dorsiflexion, 30 degrees plantarflexion  Subtalar motion: 10 degrees eversion, 15 degrees inversion    Stability Stable    Muscle Strength 5/5 tibialis anterior  5/5 gastrocnemius-soleus  5/5 posterior tibialis  5/5 peroneal/eversion strength  5/5 EHL  5/5 FHL    Neurologic Normal    Sensation Intact to light touch throughout sural, saphenous, superficial peroneal, deep peroneal and medial/lateral plantar nerve distributions.  [place]-Jay 5.07 filament (10g) testing deferred.    Cardiovascular Brisk capillary refill < 2 seconds,intact DP and PT pulses    Special Tests None      Imaging Studies:   No new images       Scribe Attestation      I,:  Crystal Pizarro PA-C am acting as a scribe while in the presence of the attending physician.:       I,:  James R Lachman, MD personally performed the services described in this documentation    as scribed in my presence.:               James R. Lachman, MD  Foot & Ankle Surgery   Department of Orthopaedic Surgery  [facility]      I personally performed the service.    James R. Lachman, MD

## 2025-03-11 ENCOUNTER — OFFICE VISIT (OUTPATIENT)
Dept: OBGYN CLINIC | Facility: CLINIC | Age: 75
End: 2025-03-11
Payer: MEDICARE

## 2025-03-11 ENCOUNTER — APPOINTMENT (OUTPATIENT)
Dept: RADIOLOGY | Facility: AMBULARY SURGERY CENTER | Age: 75
End: 2025-03-11
Attending: ORTHOPAEDIC SURGERY
Payer: MEDICARE

## 2025-03-11 VITALS — BODY MASS INDEX: 27.15 KG/M2 | WEIGHT: 173 LBS | HEIGHT: 67 IN

## 2025-03-11 DIAGNOSIS — Z01.89 ENCOUNTER FOR LOWER EXTREMITY COMPARISON IMAGING STUDY: ICD-10-CM

## 2025-03-11 DIAGNOSIS — M19.171 POST-TRAUMATIC ARTHRITIS OF RIGHT ANKLE: Primary | ICD-10-CM

## 2025-03-11 DIAGNOSIS — M19.171 POST-TRAUMATIC ARTHRITIS OF RIGHT ANKLE: ICD-10-CM

## 2025-03-11 PROCEDURE — 73600 X-RAY EXAM OF ANKLE: CPT

## 2025-03-11 PROCEDURE — 99213 OFFICE O/P EST LOW 20 MIN: CPT | Performed by: ORTHOPAEDIC SURGERY

## 2025-03-11 PROCEDURE — 73610 X-RAY EXAM OF ANKLE: CPT

## 2025-03-11 NOTE — PROGRESS NOTES
James R Lachman, M.D.  Attending, Orthopaedic Surgery  Foot and Ankle  [facility]      ORTHOPAEDIC FOOT AND ANKLE CLINIC VISIT     Assessment:     Encounter Diagnoses   Name Primary?    Post-traumatic arthritis of right ankle Yes    Encounter for lower extremity comparison imaging study      6 months out from right  [place] Total ankle replacement with rodriguez     Plan:   The patient verbalized understanding of exam findings and treatment plan. We engaged in the shared decision-making process and treatment options were discussed at length with the patient. Surgical and conservative management discussed today along with risks and benefits.  He is very happy with his total ankle replacement and is now walking pain free   Continue with proper shoe wear with orthotics if prescribed  Activity to tolerance.  Avoid impact activity  Xrays today demonstrate well aligned total ankle replacement without signs of hardware failure or loosening  Return in about  6 months for repeat evaluation- Bilateral TAA xrays      History of Present Illness:   Chief Complaint:   Chief Complaint   Patient presents with    Right Ankle - Follow-up     Jovani Yañez is a 74 y.o. male who is being seen in follow-up for right post traumatic ankle arthritis, now 6 months s/p TAA. When we last met we recommended proper shoe wear, gradually return to activity to tolerance.  Pain has 100% improved. No  residual pain present.      Pain/symptom timing:  Not longer painful during the day  Pain/symptom context:  Able to tolerate activities and work  Pain/symptom modifying factors:  Returning to activities  Pain/symptom associated signs/symptoms: none    Prior treatment   NSAIDsYes   Injections No   Bracing/Orthotics Yes    Physical Therapy No     Orthopedic Surgical History:   See below    Past Medical, Surgical and Social History:  Past Medical History:    Past Medical History:   Diagnosis Date    Anemia     Arthritis      History of radiation therapy     Hyperlipidemia     Hypertension     Kidney stone     Prostate cancer (HCC)      Past Surgical History:    Past Surgical History:   Procedure Laterality Date    ANKLE ARTHROPLASTY Left     COLONOSCOPY      CYSTOSCOPY W/ URETERAL STENT PLACEMENT      CYSTOSCOPY W/ URETERAL STENT REMOVAL      DC ARTHROPLASTY ANKLE W/IMPLANT Right 9/16/2024    Procedure: ARTHROPLASTY ANKLE, Ross;  Surgeon: James R Lachman, MD;  Location:  MAIN OR;  Service: Orthopedics    PROSTATE BIOPSY      TONSILLECTOMY       Family History: No family history on file.  Social History:    Social History     Socioeconomic History    Marital status: /Civil Union     Spouse name: Not on file    Number of children: Not on file    Years of education: Not on file    Highest education level: Not on file   Occupational History    Not on file   Tobacco Use    Smoking status: Never    Smokeless tobacco: Never   Vaping Use    Vaping status: Never Used   Substance and Sexual Activity    Alcohol use: Yes     Alcohol/week: 2.0 standard drinks of alcohol     Types: 2 Cans of beer per week    Drug use: Never    Sexual activity: Not on file   Other Topics Concern    Not on file   Social History Narrative    Not on file     Social Drivers of Health     Financial Resource Strain: Not At Risk (2/7/2025)    Received from [facility]    Financial Insecurity     In the last 12 months did you skip medications to save money?: No     In the last 12 months was there a time when you needed to see a doctor but could not because of cost?: No   Food Insecurity: No Food Insecurity (2/7/2025)    Received from [facility]    Food Insecurity     In the last 12 months did you ever eat less than you felt you should because there wasn't enough money for food?: No   Transportation Needs: No Transportation Needs (2/7/2025)    Received from [facility]    Transportation Needs     In the last 12  months have you ever had to go without healthcare because you didn't have a way to get there?: No   Physical Activity: Not on file   Stress: No Stress Concern Present (6/19/2023)    [facility]    [employer] [place] [employer] - Occupational Stress Questionnaire     Feeling of Stress : Not at all   Social Connections: Socially Integrated (2/7/2025)    Received from [facility]    Social Connection     Do you often feel lonely?: No   Intimate Partner Violence: Not At Risk (3/30/2024)    [facility]    Humiliation, Afraid, Rape, and Kick questionnaire     Fear of Current or Ex-Partner: No     Emotionally Abused: No     Physically Abused: No     Sexually Abused: No   Housing Stability: Not At Risk (2/7/2025)    Received from [facility]    Housing Stability     Are you worried that in the next 2 months you may not have stable housing?: No     Current Medications:   Current Outpatient Medications on File Prior to Visit   Medication Sig Dispense Refill    aspirin 325 mg tablet Take 1 tablet (325 mg total) by mouth 2 (two) times a day 84 tablet 0    atorvastatin (LIPITOR) 40 mg tablet Take 40 mg by mouth daily at bedtime      Ferrous Sulfate (IRON PO) Take by mouth (Patient not taking: Reported on 10/29/2024)      lisinopril (ZESTRIL) 10 mg tablet Take 10 mg by mouth daily      ondansetron (ZOFRAN-ODT) 4 mg disintegrating tablet Take 1 tablet (4 mg total) by mouth every 8 (eight) hours as needed for nausea or vomiting 15 tablet 0    oxyCODONE (Roxicodone) 5 immediate release tablet Take 1 tablet (5 mg total) by mouth every 6 (six) hours as needed for moderate pain Max Daily Amount: 20 mg 30 tablet 0    pantoprazole (PROTONIX) 40 mg tablet Take 40 mg by mouth daily       No current facility-administered medications on file prior  to visit.     Allergies: No Known Allergies      Review of Systems:  General- denies fever/chills  HEENT- denies hearing loss or sore throat  Eyes- denies eye pain or visual disturbances, denies red eyes  Respiratory- denies cough or SOB  Cardio- denies chest pain or palpitations  GI- denies abdominal pain  Endocrine- denies urinary frequency  Urinary- denies pain with urination  Musculoskeletal- Negative except noted above  Skin- denies rashes or wounds  Neurological- denies dizziness or headache  Psychiatric- denies anxiety or difficulty concentrating    Physical Exam:   There were no vitals taken for this visit.  General/Constitutional: No apparent distress: well-nourished and well developed.  Eyes: normal ocular motion  Lymphatic: No appreciable lymphadenopathy  Respiratory: Non-labored breathing  Vascular: No edema, swelling or tenderness, except as noted in detailed exam.  Integumentary: No impressive skin lesions present, except as noted in detailed exam.  Neuro: No ataxia or tremors noted  Psych: Normal mood and affect, oriented to person, place and time. Appropriate affect.  Musculoskeletal: Normal, except as noted in detailed exam and in HPI.    Examination    right    Gait Normal   Musculoskeletal  No ttp    Skin Mild swelling ankle Well-healed incisions.    Nails Normal    Range of Motion  20 degrees dorsiflexion, 30 degrees plantarflexion  Subtalar motion: 15 degrees eversion, 15 degrees inversion    Stability Stable    Muscle Strength 5/5 tibialis anterior  5/5 gastrocnemius-soleus  5/5 posterior tibialis  5/5 peroneal/eversion strength  5/5 EHL  5/5 FHL    Neurologic Normal    Sensation Intact to light touch throughout sural, saphenous, superficial peroneal, deep peroneal and medial/lateral plantar nerve distributions.  [place]-Jay 5.07 filament (10g) testing deferred.    Cardiovascular Brisk capillary refill < 2 seconds,intact DP and PT pulses    Special Tests None      Imaging Studies:   6  views of the right ankle were obtained, reviewed and interpreted independently which demonstrate hardware intact and aligned, no sign of loosening. No cyst formation, no stress fractures noted, minimal heterotopic ossification. Reviewed by me personally.        James R. Lachman, MD  Foot & Ankle Surgery   Department of Orthopaedic Surgery  [facility]    Scribe Attestation      I,:  Riana Davis am acting as a scribe while in the presence of the attending physician.:       I,:  James R Lachman, MD personally performed the services described in this documentation    as scribed in my presence.:             I personally performed the service.    James R. Lachman, MD

## 2025-03-11 NOTE — PATIENT INSTRUCTIONS
Total Ankle Replacement Care    -No impact activity (No running, no jumping, no mogul skiing, etc.)    -Wear orthotics in all shoes at all times if prescribed. If no orthotics necessary, supportive shoes at all times.*    -Call our office for a prescription for antibiotics prior to any dental work/procedure    -Keep your follow-up visits.  Total ankle replacements need to be monitored at regular intervals to catch any potential issues early.    -Use compression stockings as needed to help control swelling    -Do not ignore sudden onset redness or swelling in the ankle. Come in to the clinic for evaluation right away if you have sudden onset redness or swelling in the ankle different from your typical swelling        [employer], [employer], [employer] are good brands but I recommend going to a dedicate shoe store (not [employer] or [employer].) At these types of stores, they have experts that can fit you for shoes appropriate for your foot problem. Shoe choice is essential to solving/improving most types of foot pain.  Even after a surgery, good shoes are necessary to keep the foot as comfortable as possible.    [employer]  [address] [facility] [address]  [phone]    [address], [place] [address]  [phone]    [employer]  [address] [place] [address] [phone]    [employer]   [address] [facility]  [phone]    [employer]  [address], [place] [address]  [phone]    [employer]  [address] [place] [address]  [phone]    [facility] [email or web address]   [address] [place] [address]  [phone]    [employer]  [address] [place], [place]  [phone]    [employer] [address], [address]  [phone]    [employer] [address], [address], [place] [address] [phone]

## 2025-06-04 ENCOUNTER — APPOINTMENT (OUTPATIENT)
Dept: RADIOLOGY | Facility: AMBULARY SURGERY CENTER | Age: 75
End: 2025-06-04
Attending: ORTHOPAEDIC SURGERY
Payer: MEDICARE

## 2025-06-04 ENCOUNTER — OFFICE VISIT (OUTPATIENT)
Dept: OBGYN CLINIC | Facility: CLINIC | Age: 75
End: 2025-06-04
Payer: MEDICARE

## 2025-06-04 VITALS — BODY MASS INDEX: 27.47 KG/M2 | WEIGHT: 175 LBS | HEIGHT: 67 IN

## 2025-06-04 DIAGNOSIS — M25.572 PAIN, JOINT, ANKLE AND FOOT, LEFT: ICD-10-CM

## 2025-06-04 DIAGNOSIS — M25.372 LEFT ANKLE INSTABILITY: Primary | ICD-10-CM

## 2025-06-04 DIAGNOSIS — M19.171 POST-TRAUMATIC ARTHRITIS OF RIGHT ANKLE: ICD-10-CM

## 2025-06-04 DIAGNOSIS — Z96.662 HISTORY OF TOTAL ANKLE REPLACEMENT, LEFT: ICD-10-CM

## 2025-06-04 PROCEDURE — 99213 OFFICE O/P EST LOW 20 MIN: CPT | Performed by: ORTHOPAEDIC SURGERY

## 2025-06-04 PROCEDURE — 73610 X-RAY EXAM OF ANKLE: CPT

## 2025-06-04 NOTE — PROGRESS NOTES
James R Lachman, M.D.  Attending, Orthopaedic Surgery  Foot and Ankle  [facility]      ORTHOPAEDIC FOOT AND ANKLE CLINIC VISIT     Assessment and Plan     Assessment & Plan  Post-traumatic arthritis of right ankle    History of total ankle replacement, left    Left ankle instability  Patient has a history of a left STAR total ankle arthroplasty performed by Dr Zheng in 2023.   He is having intermittent pain/swelling about the ankle for several months. He denies any injury  We will obtain a NM bone scan w CT to evaluate for possible aspetic loosening of the prosthesis.  He does also have laxity about the left ankle when compared to the right. If his SPEC CT scan does not show loosening of the prosthesis and he continues to experience intermittent pain about the ankle then he may require a Broström procedure to tighten up his lateral ligaments. He understood and all questions were answered.   He was provided with a lace up ASO brace today in the office.   Return to clinic after NM bone scan for discussion of results and further treatment options based on these results    History of Present Illness:     Chief Complaint:   Chief Complaint   Patient presents with    Left Ankle - Pain     Having pain.   Had surgery on this foot.      Nura Yañez is a 74 y.o. male who is being seen for left ankle. Patient reports intermittent pain/swelling about the ankle for several months. He denies injury. He also denies pain at rest.  Pain is localized at anterolateral ankle with minimal radiating and described as sharp and severe. Patient denies numbness, tingling or radicular pain.  Denies history of neuropathy.  Patient does not smoke, does not have diabetes and does take blood thinners.  Patient denies family history of anesthesia complications and has not had any complications with anesthesia.     Pain/symptom timing:  Worse during the day when active  Pain/symptom context:  Worse with activites and  "work  Pain/symptom modifying factors:  Rest makes better, activities make worse  Pain/symptom associated signs/symptoms: none    Prior treatment   NSAIDsNo   Injections Yes   Bracing/Orthotics Yes    Physical Therapy Yes     Orthopedic Surgical History:   See below    Past Medical, Surgical and Social History:  Past Medical History:  has a past medical history of Anemia, Arthritis, History of radiation therapy, Hyperlipidemia, Hypertension, Kidney stone, and Prostate cancer (HCC).  Problem List: does not have any pertinent problems on file.  Past Surgical History:  has a past surgical history that includes Ankle arthroplasty (Left); Tonsillectomy; Cystoscopy w/ ureteral stent placement; Cystoscopy w/ ureteral stent removal; Colonoscopy; Prostate biopsy; and pr arthroplasty ankle w/implant (Right, 9/16/2024).  Family History: family history is not on file.  Social History:  reports that he has never smoked. He has never used smokeless tobacco. He reports current alcohol use of about 2.0 standard drinks of alcohol per week. He reports that he does not use drugs.  Current Medications: has a current medication list which includes the following prescription(s): atorvastatin, lisinopril, pantoprazole, aspirin, ferrous sulfate, ondansetron, and oxycodone.  Allergies: has no known allergies.     Review of Systems:  General- denies fever/chills  HEENT- denies hearing loss or sore throat  Eyes- denies eye pain or visual disturbances, denies red eyes  Respiratory- denies cough or SOB  Cardio- denies chest pain or palpitations  GI- denies abdominal pain  Endocrine- denies urinary frequency  Urinary- denies pain with urination  Musculoskeletal- Negative except noted above  Skin- denies rashes or wounds  Neurological- denies dizziness or headache  Psychiatric- denies anxiety or difficulty concentrating    Physical Exam:   Ht 5' 7\" (1.702 m)   Wt 79.4 kg (175 lb)   BMI 27.41 kg/m²   General/Constitutional: No apparent distress: " well-nourished and well developed.  Eyes: normal ocular motion  Cardio: RRR, Normal S1S2, No m/r/g  Lymphatic: No appreciable lymphadenopathy  Respiratory: Non-labored breathing, CTA b/l no w/c/r  Vascular: No edema, swelling or tenderness, except as noted in detailed exam.  Integumentary: No impressive skin lesions present, except as noted in detailed exam.  Neuro: No ataxia or tremors noted  Psych: Normal mood and affect, oriented to person, place and time. Appropriate affect.  Musculoskeletal: Normal, except as noted in detailed exam and in HPI.    Examination    Left    Gait Normal   Musculoskeletal Tender to palpation at ATFL, CFL    Skin Normal.      Nails Normal    Range of Motion  20 degrees dorsiflexion, 30 degrees plantarflexion  Subtalar motion: normal    Stability Stable    Muscle Strength 5/5 tibialis anterior  5/5 gastrocnemius-soleus  5/5 posterior tibialis  5/5 peroneal/eversion strength  5/5 EHL  5/5 FHL    Neurologic Normal    Sensation Intact to light touch throughout sural, saphenous, superficial peroneal, deep peroneal and medial/lateral plantar nerve distributions.  [place]-Jay 5.07 filament (10g) testing deferred.    Cardiovascular Brisk capillary refill < 2 seconds,intact DP and PT pulses    Special Tests Positive Anterior Drawer      Imaging Studies:   6 views of the left ankle were taken, reviewed and interpreted independently that demonstrate stable and intact STAR total ankle arthroplasty without signs of loosening or failure on this diagnostic study. Reviewed by me personally.        James R. Lachman, MD  Foot & Ankle Surgery   Department of Orthopaedic Surgery  [facility]      I personally performed the service.    James R. Lachman, MD    Scribe Attestation      I,:  Carlyle Gorman am acting as a scribe while in the presence of the attending physician.:       I,:  James R Lachman, MD personally performed the services described in this documentation    as  scribed in my presence.:

## 2025-06-13 ENCOUNTER — HOSPITAL ENCOUNTER (OUTPATIENT)
Dept: RADIOLOGY | Facility: HOSPITAL | Age: 75
Discharge: HOME | End: 2025-06-13
Payer: MEDICARE

## 2025-06-13 ENCOUNTER — HOSPITAL ENCOUNTER (OUTPATIENT)
Dept: NUCLEAR MEDICINE | Facility: HOSPITAL | Age: 75
Discharge: HOME | End: 2025-06-13
Attending: ORTHOPAEDIC SURGERY
Payer: MEDICARE

## 2025-06-13 DIAGNOSIS — M25.372 LEFT ANKLE INSTABILITY: ICD-10-CM

## 2025-06-13 DIAGNOSIS — Z96.662 HISTORY OF TOTAL ANKLE REPLACEMENT, LEFT: ICD-10-CM

## 2025-06-13 PROCEDURE — 78830 RP LOCLZJ TUM SPECT W/CT 1: CPT

## 2025-06-13 PROCEDURE — A9503 TC99M MEDRONATE: HCPCS

## 2025-06-13 PROCEDURE — 78315 BONE IMAGING 3 PHASE: CPT

## 2025-06-25 ENCOUNTER — OFFICE VISIT (OUTPATIENT)
Dept: OBGYN CLINIC | Facility: CLINIC | Age: 75
End: 2025-06-25
Payer: MEDICARE

## 2025-06-25 VITALS — BODY MASS INDEX: 27.47 KG/M2 | WEIGHT: 175 LBS | HEIGHT: 67 IN

## 2025-06-25 DIAGNOSIS — Z96.662 HISTORY OF TOTAL ANKLE REPLACEMENT, LEFT: Primary | ICD-10-CM

## 2025-06-25 DIAGNOSIS — M25.372 LEFT ANKLE INSTABILITY: ICD-10-CM

## 2025-06-25 PROCEDURE — 99213 OFFICE O/P EST LOW 20 MIN: CPT | Performed by: ORTHOPAEDIC SURGERY

## 2025-06-25 NOTE — ASSESSMENT & PLAN NOTE
Explained to the patient that his NM bone scan, in my opinion, despite the opinion of the radiation oncologist who interpreted the study, does not show obvious loosening of his STAR total ankle replacement by Dr Zheng in 2023. There is minimal uptake medially at the talus which may be early loosening but overall, the implant looks stable  Continue use of ASO brace in supportive shoe wear as it provides him with stability and benefit.   There is slight valgus tilt to the talar component of only about 2-3 degrees of his left total ankle replacement, we will continue to monitor this on serial x rays. A Broström procedure with a revision total ankle replacement may be considered in the future if instability continues to be a problem and he develops every day walking pain. He understood and all questions were answered  Return to clinic on 9/9/25 for yearly evaluation of his bilateral total ankle replacements. Total ankle series x rays bilaterally will be obtained at this time.

## 2025-06-25 NOTE — PROGRESS NOTES
James R Lachman, M.D.  Attending, Orthopaedic Surgery  Foot and Ankle  [facility]      ORTHOPAEDIC FOOT AND ANKLE CLINIC VISIT     Assessment and Plan     Assessment & Plan  History of total ankle replacement, left  Left ankle instability  Explained to the patient that his NM bone scan, in my opinion, despite the opinion of the radiation oncologist who interpreted the study, does not show obvious loosening of his STAR total ankle replacement by Dr Zheng in 2023. There is minimal uptake medially at the talus which may be early loosening but overall, the implant looks stable  Continue use of ASO brace in supportive shoe wear as it provides him with stability and benefit.   There is slight valgus tilt to the talar component of only about 2-3 degrees of his left total ankle replacement, we will continue to monitor this on serial x rays. A Broström procedure with a revision total ankle replacement may be considered in the future if instability continues to be a problem and he develops every day walking pain. He understood and all questions were answered  Return to clinic on 9/9/25 for yearly evaluation of his bilateral total ankle replacements. Total ankle series x rays bilaterally will be obtained at this time.           History of Present Illness:   Chief Complaint:   Chief Complaint   Patient presents with    Follow-up     Follow up of the bone scan.      Nura Yañez is a 74 y.o. male who is being seen in follow-up for left ankle pain. When we last saw he we recommended obtaining a NM bone scan to evaluate for loosening of her total ankle prosthesis.  Pain has improved with the use of an ASO brace. Residual pain is localized at anterolateral ankle with minimal radiating and described as mild and aching.     Pain/symptom timing:  Worse during the day when active  Pain/symptom context:  Worse with activites and work  Pain/symptom modifying factors:  Rest makes better, activities make  "worse  Pain/symptom associated signs/symptoms: none    Prior treatment   NSAIDsNo   Injections No   Bracing/Orthotics Yes    Physical Therapy Yes     Orthopedic Surgical History:   See below    Past Medical, Surgical and Social History:  Past Medical History:  has a past medical history of Anemia, Arthritis, History of radiation therapy, Hyperlipidemia, Hypertension, Kidney stone, and Prostate cancer (HCC).  Problem List: does not have any pertinent problems on file.  Past Surgical History:  has a past surgical history that includes Ankle arthroplasty (Left); Tonsillectomy; Cystoscopy w/ ureteral stent placement; Cystoscopy w/ ureteral stent removal; Colonoscopy; Prostate biopsy; and pr arthroplasty ankle w/implant (Right, 9/16/2024).  Family History: family history is not on file.  Social History:  reports that he has never smoked. He has never used smokeless tobacco. He reports current alcohol use of about 2.0 standard drinks of alcohol per week. He reports that he does not use drugs.  Current Medications: has a current medication list which includes the following prescription(s): atorvastatin, lisinopril, pantoprazole, aspirin, ferrous sulfate, ondansetron, and oxycodone.  Allergies: has no known allergies.     Review of Systems:  General- denies fever/chills  HEENT- denies hearing loss or sore throat  Eyes- denies eye pain or visual disturbances, denies red eyes  Respiratory- denies cough or SOB  Cardio- denies chest pain or palpitations  GI- denies abdominal pain  Endocrine- denies urinary frequency  Urinary- denies pain with urination  Musculoskeletal- Negative except noted above  Skin- denies rashes or wounds  Neurological- denies dizziness or headache  Psychiatric- denies anxiety or difficulty concentrating    Physical Exam:   Ht 5' 7\" (1.702 m)   Wt 79.4 kg (175 lb)   BMI 27.41 kg/m²   General/Constitutional: No apparent distress: well-nourished and well developed.  Eyes: normal ocular motion  Lymphatic: " No appreciable lymphadenopathy  Respiratory: Non-labored breathing  Vascular: No edema, swelling or tenderness, except as noted in detailed exam.  Integumentary: No impressive skin lesions present, except as noted in detailed exam.  Neuro: No ataxia or tremors noted  Psych: Normal mood and affect, oriented to person, place and time. Appropriate affect.  Musculoskeletal: Normal, except as noted in detailed exam and in HPI.    Examination    Left    Gait Normal   Musculoskeletal No TTP    Skin Normal.  Well-healed incisions.    Nails Normal    Range of Motion  20 degrees dorsiflexion, 30 degrees plantarflexion  Subtalar motion: normal    Stability Stable    Muscle Strength 5/5 tibialis anterior  5/5 gastrocnemius-soleus  5/5 posterior tibialis  5/5 peroneal/eversion strength  5/5 EHL  5/5 FHL    Neurologic Normal    Sensation Intact to light touch throughout sural, saphenous, superficial peroneal, deep peroneal and medial/lateral plantar nerve distributions.  [place]-Jay 5.07 filament (10g) testing deferred.    Cardiovascular Brisk capillary refill < 2 seconds,intact DP and PT pulses    Special Tests None      Imaging Studies:   NM bone Scan pect w CT of the left ankle was reviewed and interpreted by myself and demonstrate no obvious loosening of the total ankle arthroplasty. Subtle uptake about the posterior aspect of the talar component. Reviewed by me personally.    James R. Lachman, MD  Foot & Ankle Surgery   Department of Orthopaedic Surgery  [facility]      I personally performed the service.    James R. Lachman, MD

## (undated) DEVICE — SUT ETHILON 3-0 PS-1 18 IN 1663H

## (undated) DEVICE — BLADE RECIP 8  X 50 X 1MM

## (undated) DEVICE — BLADE OSC 10 X 75 X 1.9MM

## (undated) DEVICE — GLOVE SRG BIOGEL 8

## (undated) DEVICE — CHLORAPREP HI-LITE 26ML ORANGE

## (undated) DEVICE — INTENDED FOR TISSUE SEPARATION, AND OTHER PROCEDURES THAT REQUIRE A SHARP SURGICAL BLADE TO PUNCTURE OR CUT.: Brand: BARD-PARKER SAFETY BLADES SIZE 15, STERILE

## (undated) DEVICE — SUT VICRYL 2-0 CT-2 27 IN J269H

## (undated) DEVICE — BANDAGE, ESMARK LF STR 6"X9' (20/CS): Brand: CYPRESS

## (undated) DEVICE — DRAPE SHEET THREE QUARTER

## (undated) DEVICE — Device

## (undated) DEVICE — CAST PADDING 4 IN STERILE

## (undated) DEVICE — C-ARM: Brand: UNBRANDED

## (undated) DEVICE — IMPERVIOUS STOCKINETTE: Brand: DEROYAL

## (undated) DEVICE — BETHLEHEM UNIV MAJ EXT ,KIT: Brand: CARDINAL HEALTH

## (undated) DEVICE — SPECIMEN CONTAINER STERILE PEEL PACK

## (undated) DEVICE — GLOVE INDICATOR PI UNDERGLOVE SZ 6.5 BLUE

## (undated) DEVICE — GAUZE SPONGES,16 PLY: Brand: CURITY

## (undated) DEVICE — NEPTUNE E-SEP SMOKE EVACUATION PENCIL, COATED, 70MM BLADE, PUSH BUTTON SWITCH: Brand: NEPTUNE E-SEP

## (undated) DEVICE — INTENDED FOR TISSUE SEPARATION, AND OTHER PROCEDURES THAT REQUIRE A SHARP SURGICAL BLADE TO PUNCTURE OR CUT.: Brand: BARD-PARKER ® CARBON RIB-BACK BLADES

## (undated) DEVICE — SUT VICRYL 4-0 PS-2 18 IN J496G

## (undated) DEVICE — CUFF TOURNIQUET 30 X 4 IN QUICK CONNECT DISP 1BLA

## (undated) DEVICE — THREADED PIN SIZE 5.1 COLLARLESS 2PK
Type: IMPLANTABLE DEVICE | Site: ANKLE | Status: NON-FUNCTIONAL
Brand: GPS
Removed: 2024-09-16

## (undated) DEVICE — 3.5" PIN POUCH
Type: IMPLANTABLE DEVICE | Site: ANKLE | Status: NON-FUNCTIONAL
Brand: VANTAGE
Removed: 2024-09-16

## (undated) DEVICE — 2.5" PIN POUCH
Type: IMPLANTABLE DEVICE | Site: ANKLE | Status: NON-FUNCTIONAL
Brand: VANTAGE
Removed: 2024-09-16

## (undated) DEVICE — 3M™ TEGADERM™ TRANSPARENT FILM DRESSING FRAME STYLE, 1626W, 4 IN X 4-3/4 IN (10 CM X 12 CM), 50/CT 4CT/CASE: Brand: 3M™ TEGADERM™

## (undated) DEVICE — TALAR TRIAL SCREW POUCH
Type: IMPLANTABLE DEVICE | Site: ANKLE | Status: NON-FUNCTIONAL
Brand: VANTAGE
Removed: 2024-09-16

## (undated) DEVICE — ABDOMINAL PAD: Brand: DERMACEA

## (undated) DEVICE — GLOVE SRG BIOGEL 6

## (undated) DEVICE — CAST PADDING 6IN UNSTERILE

## (undated) DEVICE — HEAVY DUTY TABLE COVER: Brand: CONVERTORS

## (undated) DEVICE — MICRO DUAL CUT (9.0 X 0.38 X 18.5MM)

## (undated) DEVICE — OCCLUSIVE GAUZE STRIP,3% BISMUTH TRIBROMOPHENATE IN PETROLATUM BLEND: Brand: XEROFORM

## (undated) DEVICE — DRAPE C-ARMOUR

## (undated) DEVICE — TAPE CAST 4IN FIBERGLASS 4YD WHITE

## (undated) DEVICE — GLOVE INDICATOR PI UNDERGLOVE SZ 8 BLUE

## (undated) DEVICE — PRECISION THIN EXTENDED SHANK (27.0 X 0.38MM)